# Patient Record
Sex: MALE | Race: BLACK OR AFRICAN AMERICAN | NOT HISPANIC OR LATINO | Employment: FULL TIME | URBAN - METROPOLITAN AREA
[De-identification: names, ages, dates, MRNs, and addresses within clinical notes are randomized per-mention and may not be internally consistent; named-entity substitution may affect disease eponyms.]

---

## 2023-05-30 ENCOUNTER — OFFICE VISIT (OUTPATIENT)
Dept: PEDIATRICS | Facility: CLINIC | Age: 23
End: 2023-05-30
Payer: COMMERCIAL

## 2023-05-30 VITALS — BODY MASS INDEX: 29.34 KG/M2 | TEMPERATURE: 98.1 F | WEIGHT: 160.44 LBS

## 2023-05-30 DIAGNOSIS — L25.9 CONTACT DERMATITIS AND ECZEMA: Primary | ICD-10-CM

## 2023-05-30 DIAGNOSIS — S92.404A CLOSED NONDISPLACED FRACTURE OF PHALANX OF RIGHT GREAT TOE, UNSPECIFIED PHALANX, INITIAL ENCOUNTER: ICD-10-CM

## 2023-05-30 DIAGNOSIS — M79.671 FOOT PAIN, RIGHT: ICD-10-CM

## 2023-05-30 PROCEDURE — 99214 OFFICE O/P EST MOD 30 MIN: CPT | Performed by: PEDIATRICS

## 2023-05-30 PROCEDURE — L4360 PNEUMAT WALKING BOOT PRE CST: HCPCS | Performed by: PEDIATRICS

## 2023-05-30 RX ORDER — TRIAMCINOLONE ACETONIDE 1 MG/G
OINTMENT TOPICAL 2 TIMES DAILY PRN
Qty: 15 G | Refills: 1 | Status: SHIPPED | OUTPATIENT
Start: 2023-05-30

## 2023-05-30 NOTE — PROGRESS NOTES
Willian Glez is a 22 y.o. male who presents for Foot Pain and a rash on his neck    HPI  Willian tripped over the dog a few days ago and has pain in his right great toe.  He has been limping and has not been going to work.  He also has a rash onhis neck that is burning and itchy    For these reasons he has not been attending work and has some paperwork that needs to be signed.  Objective   Temp 36.7 °C (98.1 °F)   Wt 72.8 kg (160 lb 7 oz)   BMI 29.34 kg/m²     Physical Exam  Constitutional:       General: He is not in acute distress.     Appearance: Normal appearance. He is normal weight.   Musculoskeletal:      Right foot: Tenderness (over great toe and distal 1st metatarsal- no visible bruising) present. No swelling or deformity.      Comments: No damage to nail   Skin:     Findings: Rash present. Rash is scaling (thickened red inflalmed dry around neck on areas of jacket collar distribution).         Assessment/Plan   1. Contact dermatitis and eczema  triamcinolone (Kenalog) 0.1 % ointment      2. Foot pain, right  XR foot left 1-2 views    XR foot left 1-2 views    CANCELED: XR foot right 1-2 views      3. Closed nondisplaced fracture of phalanx of right great toe, unspecified phalanx, initial encounter      Placed in walking boot for 3 weeks        Will call with official radiology reading    Supportive care measures and expected course of condition reviewed.  Followup as needed no improvement.

## 2023-05-31 DIAGNOSIS — J45.909 UNSPECIFIED ASTHMA, UNCOMPLICATED (HHS-HCC): ICD-10-CM

## 2023-05-31 RX ORDER — MOMETASONE FUROATE AND FORMOTEROL FUMARATE DIHYDRATE 200; 5 UG/1; UG/1
AEROSOL RESPIRATORY (INHALATION)
Qty: 8.8 G | Refills: 1 | Status: SHIPPED | OUTPATIENT
Start: 2023-05-31 | End: 2024-02-05

## 2023-05-31 NOTE — TELEPHONE ENCOUNTER
Please let Willian know I refilled his asthma medication, and he needs to make a well visit in order to continue prescribing his meds.

## 2023-06-22 PROBLEM — R74.8 ELEVATED LIVER ENZYMES: Status: ACTIVE | Noted: 2023-06-22

## 2023-06-22 PROBLEM — J06.9 URI (UPPER RESPIRATORY INFECTION): Status: ACTIVE | Noted: 2023-06-22

## 2023-06-22 PROBLEM — G47.00 INSOMNIA: Status: ACTIVE | Noted: 2023-06-22

## 2023-06-22 PROBLEM — K80.20 CHOLELITHIASIS: Status: ACTIVE | Noted: 2023-06-22

## 2023-06-22 PROBLEM — L30.9 ECZEMA: Status: ACTIVE | Noted: 2023-06-22

## 2023-06-22 PROBLEM — R11.10 EMESIS: Status: ACTIVE | Noted: 2023-06-22

## 2023-06-22 PROBLEM — J45.909 ASTHMA (HHS-HCC): Status: ACTIVE | Noted: 2023-06-22

## 2023-06-22 PROBLEM — T78.2XXA ANAPHYLAXIS: Status: ACTIVE | Noted: 2023-06-22

## 2023-06-22 PROBLEM — H93.13 TINNITUS OF BOTH EARS: Status: ACTIVE | Noted: 2023-06-22

## 2023-06-22 PROBLEM — R04.0 RECURRENT EPISTAXIS: Status: ACTIVE | Noted: 2023-06-22

## 2023-06-22 PROBLEM — R52 COMPLAINTS OF TOTAL BODY PAIN: Status: ACTIVE | Noted: 2023-06-22

## 2023-06-22 PROBLEM — R19.7 DIARRHEA: Status: ACTIVE | Noted: 2023-06-22

## 2023-06-22 PROBLEM — F54 PSYCHOLOGICAL FACTORS AFFECTING MEDICAL CONDITION: Status: ACTIVE | Noted: 2023-06-22

## 2023-06-22 PROBLEM — R10.11 RIGHT UPPER QUADRANT ABDOMINAL PAIN: Status: ACTIVE | Noted: 2023-06-22

## 2023-06-22 PROBLEM — N50.0 ATROPHIC TESTICLE: Status: ACTIVE | Noted: 2023-06-22

## 2023-06-22 PROBLEM — H10.10 ALLERGIC RHINOCONJUNCTIVITIS: Status: ACTIVE | Noted: 2023-06-22

## 2023-06-22 PROBLEM — G43.909 MIGRAINE HEADACHE: Status: ACTIVE | Noted: 2023-06-22

## 2023-06-22 PROBLEM — Z02.82 ADOPTED: Status: ACTIVE | Noted: 2023-06-22

## 2023-06-22 PROBLEM — R10.9 ABDOMINAL PAIN: Status: ACTIVE | Noted: 2023-06-22

## 2023-06-22 PROBLEM — Z91.011 ALLERGY TO MILK PRODUCTS: Status: ACTIVE | Noted: 2023-06-22

## 2023-06-22 PROBLEM — Z78.9 ADOPTED: Status: ACTIVE | Noted: 2023-06-22

## 2023-06-22 PROBLEM — J30.9 ALLERGIC RHINOCONJUNCTIVITIS: Status: ACTIVE | Noted: 2023-06-22

## 2023-06-22 PROBLEM — K59.00 CONSTIPATION: Status: ACTIVE | Noted: 2023-06-22

## 2023-06-22 PROBLEM — R10.9 STOMACH PAIN: Status: ACTIVE | Noted: 2023-06-22

## 2023-06-22 PROBLEM — L20.9 ATOPIC DERMATITIS: Status: ACTIVE | Noted: 2023-06-22

## 2023-06-22 PROBLEM — R10.811 RIGHT UPPER QUADRANT ABDOMINAL TENDERNESS: Status: ACTIVE | Noted: 2023-06-22

## 2023-06-22 PROBLEM — U07.1 DISEASE DUE TO SEVERE ACUTE RESPIRATORY SYNDROME CORONAVIRUS 2 (SARS-COV-2): Status: ACTIVE | Noted: 2023-06-22

## 2023-06-22 PROBLEM — F43.23 ADJUSTMENT DISORDER WITH MIXED ANXIETY AND DEPRESSED MOOD: Status: ACTIVE | Noted: 2023-06-22

## 2023-06-22 PROBLEM — R51.9 HEADACHE: Status: ACTIVE | Noted: 2023-06-22

## 2023-06-22 PROBLEM — G43.109 MIGRAINE WITH AURA: Status: ACTIVE | Noted: 2023-06-22

## 2024-01-06 ENCOUNTER — APPOINTMENT (OUTPATIENT)
Dept: RADIOLOGY | Facility: HOSPITAL | Age: 24
End: 2024-01-06
Payer: COMMERCIAL

## 2024-01-06 ENCOUNTER — HOSPITAL ENCOUNTER (EMERGENCY)
Facility: HOSPITAL | Age: 24
Discharge: HOME | End: 2024-01-06
Attending: EMERGENCY MEDICINE
Payer: COMMERCIAL

## 2024-01-06 VITALS
RESPIRATION RATE: 18 BRPM | BODY MASS INDEX: 27.31 KG/M2 | HEART RATE: 89 BPM | HEIGHT: 64 IN | SYSTOLIC BLOOD PRESSURE: 121 MMHG | TEMPERATURE: 97.9 F | WEIGHT: 160 LBS | DIASTOLIC BLOOD PRESSURE: 78 MMHG | OXYGEN SATURATION: 99 %

## 2024-01-06 DIAGNOSIS — R10.31 RIGHT LOWER QUADRANT ABDOMINAL PAIN: Primary | ICD-10-CM

## 2024-01-06 DIAGNOSIS — K59.00 CONSTIPATION, UNSPECIFIED CONSTIPATION TYPE: ICD-10-CM

## 2024-01-06 LAB
ALBUMIN SERPL BCP-MCNC: 4.7 G/DL (ref 3.4–5)
ALP SERPL-CCNC: 58 U/L (ref 33–120)
ALT SERPL W P-5'-P-CCNC: 48 U/L (ref 10–52)
ANION GAP SERPL CALC-SCNC: 12 MMOL/L (ref 10–20)
AST SERPL W P-5'-P-CCNC: 26 U/L (ref 9–39)
BASOPHILS # BLD AUTO: 0.03 X10*3/UL (ref 0–0.1)
BASOPHILS NFR BLD AUTO: 0.5 %
BILIRUB SERPL-MCNC: 0.3 MG/DL (ref 0–1.2)
BUN SERPL-MCNC: 18 MG/DL (ref 6–23)
CALCIUM SERPL-MCNC: 10 MG/DL (ref 8.6–10.6)
CHLORIDE SERPL-SCNC: 105 MMOL/L (ref 98–107)
CO2 SERPL-SCNC: 25 MMOL/L (ref 21–32)
CREAT SERPL-MCNC: 0.97 MG/DL (ref 0.5–1.3)
EOSINOPHIL # BLD AUTO: 0.21 X10*3/UL (ref 0–0.7)
EOSINOPHIL NFR BLD AUTO: 3.4 %
ERYTHROCYTE [DISTWIDTH] IN BLOOD BY AUTOMATED COUNT: 13.1 % (ref 11.5–14.5)
FLUAV RNA RESP QL NAA+PROBE: NOT DETECTED
FLUBV RNA RESP QL NAA+PROBE: NOT DETECTED
GFR SERPL CREATININE-BSD FRML MDRD: >90 ML/MIN/1.73M*2
GLUCOSE SERPL-MCNC: 113 MG/DL (ref 74–99)
HCT VFR BLD AUTO: 38.7 % (ref 41–52)
HGB BLD-MCNC: 13 G/DL (ref 13.5–17.5)
HOLD SPECIMEN: NORMAL
IMM GRANULOCYTES # BLD AUTO: 0.01 X10*3/UL (ref 0–0.7)
IMM GRANULOCYTES NFR BLD AUTO: 0.2 % (ref 0–0.9)
LIPASE SERPL-CCNC: 28 U/L (ref 9–82)
LYMPHOCYTES # BLD AUTO: 2.4 X10*3/UL (ref 1.2–4.8)
LYMPHOCYTES NFR BLD AUTO: 39.1 %
MCH RBC QN AUTO: 24.8 PG (ref 26–34)
MCHC RBC AUTO-ENTMCNC: 33.6 G/DL (ref 32–36)
MCV RBC AUTO: 74 FL (ref 80–100)
MONOCYTES # BLD AUTO: 0.55 X10*3/UL (ref 0.1–1)
MONOCYTES NFR BLD AUTO: 9 %
NEUTROPHILS # BLD AUTO: 2.94 X10*3/UL (ref 1.2–7.7)
NEUTROPHILS NFR BLD AUTO: 47.8 %
NRBC BLD-RTO: 0 /100 WBCS (ref 0–0)
PLATELET # BLD AUTO: 289 X10*3/UL (ref 150–450)
POTASSIUM SERPL-SCNC: 4.3 MMOL/L (ref 3.5–5.3)
PROT SERPL-MCNC: 7.2 G/DL (ref 6.4–8.2)
RBC # BLD AUTO: 5.24 X10*6/UL (ref 4.5–5.9)
SARS-COV-2 RNA RESP QL NAA+PROBE: NOT DETECTED
SODIUM SERPL-SCNC: 138 MMOL/L (ref 136–145)
WBC # BLD AUTO: 6.1 X10*3/UL (ref 4.4–11.3)

## 2024-01-06 PROCEDURE — 84075 ASSAY ALKALINE PHOSPHATASE: CPT

## 2024-01-06 PROCEDURE — 96361 HYDRATE IV INFUSION ADD-ON: CPT

## 2024-01-06 PROCEDURE — 96374 THER/PROPH/DIAG INJ IV PUSH: CPT

## 2024-01-06 PROCEDURE — 99285 EMERGENCY DEPT VISIT HI MDM: CPT | Performed by: EMERGENCY MEDICINE

## 2024-01-06 PROCEDURE — 2500000004 HC RX 250 GENERAL PHARMACY W/ HCPCS (ALT 636 FOR OP/ED): Mod: SE

## 2024-01-06 PROCEDURE — 36415 COLL VENOUS BLD VENIPUNCTURE: CPT

## 2024-01-06 PROCEDURE — 74177 CT ABD & PELVIS W/CONTRAST: CPT | Performed by: RADIOLOGY

## 2024-01-06 PROCEDURE — 87636 SARSCOV2 & INF A&B AMP PRB: CPT

## 2024-01-06 PROCEDURE — 2550000001 HC RX 255 CONTRASTS: Mod: SE | Performed by: EMERGENCY MEDICINE

## 2024-01-06 PROCEDURE — 74177 CT ABD & PELVIS W/CONTRAST: CPT

## 2024-01-06 PROCEDURE — 83690 ASSAY OF LIPASE: CPT

## 2024-01-06 PROCEDURE — 85025 COMPLETE CBC W/AUTO DIFF WBC: CPT

## 2024-01-06 PROCEDURE — 99284 EMERGENCY DEPT VISIT MOD MDM: CPT | Performed by: EMERGENCY MEDICINE

## 2024-01-06 RX ORDER — POLYETHYLENE GLYCOL 3350 17 G/17G
17 POWDER, FOR SOLUTION ORAL DAILY
Qty: 51 G | Refills: 0 | Status: SHIPPED | OUTPATIENT
Start: 2024-01-06 | End: 2024-01-09

## 2024-01-06 RX ORDER — ACETAMINOPHEN 325 MG/1
975 TABLET ORAL ONCE
Status: COMPLETED | OUTPATIENT
Start: 2024-01-06 | End: 2024-01-06

## 2024-01-06 RX ORDER — ONDANSETRON HYDROCHLORIDE 2 MG/ML
4 INJECTION, SOLUTION INTRAVENOUS ONCE
Status: COMPLETED | OUTPATIENT
Start: 2024-01-06 | End: 2024-01-06

## 2024-01-06 RX ADMIN — SODIUM CHLORIDE, POTASSIUM CHLORIDE, SODIUM LACTATE AND CALCIUM CHLORIDE 1000 ML: 600; 310; 30; 20 INJECTION, SOLUTION INTRAVENOUS at 01:52

## 2024-01-06 RX ADMIN — ACETAMINOPHEN 975 MG: 325 TABLET ORAL at 01:18

## 2024-01-06 RX ADMIN — IOHEXOL 80 ML: 350 INJECTION, SOLUTION INTRAVENOUS at 03:05

## 2024-01-06 RX ADMIN — ONDANSETRON 4 MG: 2 INJECTION INTRAMUSCULAR; INTRAVENOUS at 01:18

## 2024-01-06 ASSESSMENT — COLUMBIA-SUICIDE SEVERITY RATING SCALE - C-SSRS
6. HAVE YOU EVER DONE ANYTHING, STARTED TO DO ANYTHING, OR PREPARED TO DO ANYTHING TO END YOUR LIFE?: NO
1. IN THE PAST MONTH, HAVE YOU WISHED YOU WERE DEAD OR WISHED YOU COULD GO TO SLEEP AND NOT WAKE UP?: NO
2. HAVE YOU ACTUALLY HAD ANY THOUGHTS OF KILLING YOURSELF?: NO

## 2024-01-06 NOTE — PROGRESS NOTES
Patient signed to me pending CT imaging of the abdomen pelvis with concern for appendicitis.  CT imaging reviewed unremarkable for any acute appendicitis, any acute intra-abdominal pathology.  Patient is noted be constipated.  Patient provided with prescription for MiraLAX, discharge from emergency department stable condition.    Patient seen and discussed with time physician.    Chuck Martin M.D.  PGY-3 EM

## 2024-01-06 NOTE — ED TRIAGE NOTES
Pt to ED c/o right sided bd pain that began earlier today. Pt states he has had continuous nausea with one episode of vomiting  while at work today. Pt also states 3-4 episodes of diarrhea. Pt rates pain 410 currently. Pt has PMH of asthma

## 2024-01-06 NOTE — ED PROVIDER NOTES
CC: Abdominal Pain     HPI:  Patient is a 23-year-old male with a past medical history of asthma who presents to the ED for right lower quadrant abdominal pain.  Patient noted that his pain began approximately today at 10 AM.  He is noted vomiting as well as diarrhea since that time.  He has noted 1 episode of nonbloody emesis and 4 episodes of nonbloody diarrhea.  Patient noted that previously he had viral URI type symptoms of headache, cough, and congestion.  He notes resolution over those symptoms.  Continues to note nausea.  Denied alcohol, tobacco, and substance use.  Denies history of abdominal surgery.  Denied any new medications or new foods.  Patient notes that he works in [employer] and is had multiple sick contacts.  Denied fevers, chills, trauma, falls, chest pain, difficulty breathing, dysuria, testicular pain, and headache.    Limitations to history: None  Independent historian(s): None  Records Reviewed: Recent available ED and inpatient notes reviewed in EMR.    PMHx/PSHx:  Per HPI.   - has a past medical history of Bilateral intraabdominal testes, Cough, unspecified (2016), Lower abdominal pain, unspecified, Personal history of (healed) traumatic fracture (2016), Personal history of other diseases of the digestive system (2014),  , unspecified weeks of gestation, Testicular pain, unspecified (2016), Tinnitus, bilateral (2020), and Unspecified fracture of unspecified metacarpal bone, initial encounter for closed fracture (2016).  - has a past surgical history that includes Other surgical history (2014) and Other surgical history (2014).    Medications:  Reviewed in EMR. See EMR for complete list of medications and doses.    Allergies:  Bee pollen and Peanut    Social History:  - Tobacco:  has no history on file for tobacco use.   - Alcohol:  has no history on file for alcohol use.   - Illicit Drugs:  has no history on file for drug use.      ROS:  Per HPI.       ???????????????????????????????????????????????????????????????  Triage Vitals:  T 36.6 °C (97.9 °F)  HR 89  /78  RR 18  O2        Physical Exam    General: Patient resting comfortably in bed, no acute distress, breathing easily, well appearing, and appropriately conversational without confusion or gross mental status changes.  Head: Normocephalic. Atraumatic.    Neck: FROM. No gross masses.   Eyes: EOMI. No scleral icterus or injection.   ENT: Moist mucous membranes, no apparent trauma or lesions.  CV: Regular rhythm. No murmurs, rubs, gallops appreciated. 2+ radial pulses bilaterally.  Resp: Clear to auscultation bilaterally. No respiratory distress.   GI: Soft, non-distended.  TTP of the right lower quadrant.  Positive McBurney's point.  Negative Rovsing sign.  Positive iliopsoas sign.  No rebound tenderness or guarding. No palpable masses.  : No suprapubic or CVA tenderness.   MSK: Full ROM in bilateral upper and lower extremities. No gross step offs or deformities.  EXT: No peripheral edema, contusions, or wounds.   Skin: Warm and dry, no rashes or lesions.  Neuro:  No focal neurological deficits from stated baseline. Speech fluent.  Psych: Appropriate mood and behavior, converses and responds appropriately.    ???????????????????????????????????????????????????????????????  Labs:   Labs Reviewed   CBC WITH AUTO DIFFERENTIAL   COMPREHENSIVE METABOLIC PANEL   LIPASE   SARS-COV-2 AND INFLUENZA A/B PCR        Imaging:   No orders to display       MDM:  Patient is a 23-year-old male with a past medical history of asthma who presents to the ED for right lower quadrant abdominal pain.  Patient is HDS and vitals WNL.  Physical exam findings significant for right lower quadrant TTP, positive McBurney's point, and positive iliopsoas sign.  Low clinical concern for SBO, mesenteric ischemia, pyelonephritis, cholecystitis, STI, UTI, and aortic pathology.  Patient ordered administered IV  fluids, Tylenol, and Zofran.  CBC did not display leukocytosis and mild anemia of 13.0.  Electrolytes were within normal limits.  LFTs were unremarkable.  Lipase was normal.  Viral testing was negative.  Concern for possibility of viral etiology of the patient's presentation versus appendicitis.  CTAP ordered to evaluate for appendicitis.  Patient signed out to Dr. Martin in stable condition pending CTAP.    ED Course:  ED Course as of 01/07/24 1656   Sat Jan 06, 2024   0450 CT abdomen pelvis w IV contrast [JY]      ED Course User Index  [JY] Armando AUGUSTINE Guillaume, DO         Diagnoses as of 01/07/24 1656   Right lower quadrant abdominal pain   Constipation, unspecified constipation type       Social Determinants Limiting Care:  None identified    Disposition:  Signed out to Dr. Martin in stable condition.    Fernando Quinones MD   Emergency Medicine PGY-2  [facility]    Comment: Please note this report has been produced using speech recognition software and may contain errors related to that system including errors in grammar, punctuation, and spelling as well as words and phrases that may be inappropriate.  If there are any questions or concerns please feel free to contact the dictating provider for clarification.    Procedures ? SmartLinks last updated 1/6/2024 1:15 AM        Fernando Quinones MD  Resident  01/07/24 [id number]

## 2024-02-05 DIAGNOSIS — J45.909 UNSPECIFIED ASTHMA, UNCOMPLICATED (HHS-HCC): ICD-10-CM

## 2024-02-05 RX ORDER — MOMETASONE FUROATE AND FORMOTEROL FUMARATE DIHYDRATE 200; 5 UG/1; UG/1
AEROSOL RESPIRATORY (INHALATION)
Qty: 13 G | Refills: 6 | Status: SHIPPED | OUTPATIENT
Start: 2024-02-05

## 2024-02-16 ENCOUNTER — TELEPHONE (OUTPATIENT)
Dept: PEDIATRICS | Facility: CLINIC | Age: 24
End: 2024-02-16
Payer: COMMERCIAL

## 2024-02-16 NOTE — TELEPHONE ENCOUNTER
Called and left a message.  [facility] so I suggested he go to medicaid.gov and see if he still qualifies or what other programs might be available for him.  Suggested he call me back with questions.

## 2024-02-16 NOTE — TELEPHONE ENCOUNTER
Willian left a . He was taken off of his health insurance. He does not have coverage for his medication. He is wondering if there is something that you can do. Thanks     [phone]

## 2024-08-02 ENCOUNTER — HOSPITAL ENCOUNTER (EMERGENCY)
Facility: HOSPITAL | Age: 24
Discharge: HOME | End: 2024-08-03
Attending: EMERGENCY MEDICINE
Payer: COMMERCIAL

## 2024-08-02 VITALS
SYSTOLIC BLOOD PRESSURE: 127 MMHG | HEART RATE: 77 BPM | HEIGHT: 64 IN | WEIGHT: 150 LBS | BODY MASS INDEX: 25.61 KG/M2 | OXYGEN SATURATION: 97 % | TEMPERATURE: 98.2 F | DIASTOLIC BLOOD PRESSURE: 65 MMHG | RESPIRATION RATE: 18 BRPM

## 2024-08-02 DIAGNOSIS — K29.00 ACUTE GASTRITIS WITHOUT HEMORRHAGE, UNSPECIFIED GASTRITIS TYPE: Primary | ICD-10-CM

## 2024-08-02 LAB
ANION GAP BLDV CALCULATED.4IONS-SCNC: 12 MMOL/L (ref 10–25)
BASE EXCESS BLDV CALC-SCNC: 1.7 MMOL/L (ref -2–3)
BODY TEMPERATURE: 37 DEGREES CELSIUS
CA-I BLDV-SCNC: 1.17 MMOL/L (ref 1.1–1.33)
CHLORIDE BLDV-SCNC: 101 MMOL/L (ref 98–107)
GLUCOSE BLDV-MCNC: 95 MG/DL (ref 74–99)
HCO3 BLDV-SCNC: 26.6 MMOL/L (ref 22–26)
HCT VFR BLD EST: 43 % (ref 41–52)
HGB BLDV-MCNC: 14.4 G/DL (ref 13.5–17.5)
INHALED O2 CONCENTRATION: 21 %
LACTATE BLDV-SCNC: 0.9 MMOL/L (ref 0.4–2)
OXYHGB MFR BLDV: 74.3 % (ref 45–75)
PCO2 BLDV: 42 MM HG (ref 41–51)
PH BLDV: 7.41 PH (ref 7.33–7.43)
PO2 BLDV: 46 MM HG (ref 35–45)
POTASSIUM BLDV-SCNC: 4.2 MMOL/L (ref 3.5–5.3)
SAO2 % BLDV: 75 % (ref 45–75)
SODIUM BLDV-SCNC: 135 MMOL/L (ref 136–145)

## 2024-08-02 PROCEDURE — 84132 ASSAY OF SERUM POTASSIUM: CPT | Mod: 59

## 2024-08-02 PROCEDURE — 84132 ASSAY OF SERUM POTASSIUM: CPT | Performed by: EMERGENCY MEDICINE

## 2024-08-02 PROCEDURE — 84443 ASSAY THYROID STIM HORMONE: CPT | Performed by: EMERGENCY MEDICINE

## 2024-08-02 PROCEDURE — 83735 ASSAY OF MAGNESIUM: CPT

## 2024-08-02 PROCEDURE — 85025 COMPLETE CBC W/AUTO DIFF WBC: CPT

## 2024-08-02 PROCEDURE — 99285 EMERGENCY DEPT VISIT HI MDM: CPT | Performed by: EMERGENCY MEDICINE

## 2024-08-02 PROCEDURE — 99284 EMERGENCY DEPT VISIT MOD MDM: CPT

## 2024-08-02 PROCEDURE — 83690 ASSAY OF LIPASE: CPT

## 2024-08-02 PROCEDURE — 36415 COLL VENOUS BLD VENIPUNCTURE: CPT | Performed by: EMERGENCY MEDICINE

## 2024-08-02 RX ORDER — DICYCLOMINE HYDROCHLORIDE 10 MG/ML
20 INJECTION INTRAMUSCULAR ONCE
Status: DISCONTINUED | OUTPATIENT
Start: 2024-08-02 | End: 2024-08-03

## 2024-08-02 RX ORDER — FAMOTIDINE 10 MG/ML
20 INJECTION INTRAVENOUS ONCE
Status: COMPLETED | OUTPATIENT
Start: 2024-08-02 | End: 2024-08-03

## 2024-08-02 RX ORDER — ONDANSETRON HYDROCHLORIDE 2 MG/ML
4 INJECTION, SOLUTION INTRAVENOUS ONCE
Status: COMPLETED | OUTPATIENT
Start: 2024-08-02 | End: 2024-08-03

## 2024-08-02 ASSESSMENT — COLUMBIA-SUICIDE SEVERITY RATING SCALE - C-SSRS
1. IN THE PAST MONTH, HAVE YOU WISHED YOU WERE DEAD OR WISHED YOU COULD GO TO SLEEP AND NOT WAKE UP?: NO
6. HAVE YOU EVER DONE ANYTHING, STARTED TO DO ANYTHING, OR PREPARED TO DO ANYTHING TO END YOUR LIFE?: NO
2. HAVE YOU ACTUALLY HAD ANY THOUGHTS OF KILLING YOURSELF?: NO

## 2024-08-02 ASSESSMENT — LIFESTYLE VARIABLES
HAVE YOU EVER FELT YOU SHOULD CUT DOWN ON YOUR DRINKING: NO
EVER FELT BAD OR GUILTY ABOUT YOUR DRINKING: NO
TOTAL SCORE: 0
EVER HAD A DRINK FIRST THING IN THE MORNING TO STEADY YOUR NERVES TO GET RID OF A HANGOVER: NO
HAVE PEOPLE ANNOYED YOU BY CRITICIZING YOUR DRINKING: NO

## 2024-08-02 ASSESSMENT — PAIN - FUNCTIONAL ASSESSMENT: PAIN_FUNCTIONAL_ASSESSMENT: 0-10

## 2024-08-02 ASSESSMENT — PAIN SCALES - GENERAL: PAINLEVEL_OUTOF10: 7

## 2024-08-02 ASSESSMENT — PAIN DESCRIPTION - PROGRESSION: CLINICAL_PROGRESSION: NOT CHANGED

## 2024-08-02 ASSESSMENT — PAIN DESCRIPTION - LOCATION: LOCATION: ABDOMEN

## 2024-08-02 NOTE — Clinical Note
Willian Glez was seen and treated in our emergency department on 8/2/2024.  He may return to work on 08/05/2024.       If you have any questions or concerns, please don't hesitate to call.      El Baker, DO

## 2024-08-03 ENCOUNTER — APPOINTMENT (OUTPATIENT)
Dept: RADIOLOGY | Facility: HOSPITAL | Age: 24
End: 2024-08-03
Payer: COMMERCIAL

## 2024-08-03 LAB
ALBUMIN SERPL BCP-MCNC: 4.8 G/DL (ref 3.4–5)
ALP SERPL-CCNC: 59 U/L (ref 33–120)
ALT SERPL W P-5'-P-CCNC: 20 U/L (ref 10–52)
ANION GAP SERPL CALC-SCNC: 15 MMOL/L (ref 10–20)
APPEARANCE UR: ABNORMAL
AST SERPL W P-5'-P-CCNC: 24 U/L (ref 9–39)
BASOPHILS # BLD AUTO: 0.05 X10*3/UL (ref 0–0.1)
BASOPHILS NFR BLD AUTO: 0.5 %
BILIRUB SERPL-MCNC: 0.5 MG/DL (ref 0–1.2)
BILIRUB UR STRIP.AUTO-MCNC: NEGATIVE MG/DL
BUN SERPL-MCNC: 21 MG/DL (ref 6–23)
CALCIUM SERPL-MCNC: 9.7 MG/DL (ref 8.6–10.6)
CHLORIDE SERPL-SCNC: 102 MMOL/L (ref 98–107)
CO2 SERPL-SCNC: 25 MMOL/L (ref 21–32)
COLOR UR: YELLOW
CREAT SERPL-MCNC: 1.26 MG/DL (ref 0.5–1.3)
EGFRCR SERPLBLD CKD-EPI 2021: 83 ML/MIN/1.73M*2
EOSINOPHIL # BLD AUTO: 0.26 X10*3/UL (ref 0–0.7)
EOSINOPHIL NFR BLD AUTO: 2.8 %
ERYTHROCYTE [DISTWIDTH] IN BLOOD BY AUTOMATED COUNT: 13.6 % (ref 11.5–14.5)
GLUCOSE SERPL-MCNC: 90 MG/DL (ref 74–99)
GLUCOSE UR STRIP.AUTO-MCNC: NORMAL MG/DL
HCT VFR BLD AUTO: 41.9 % (ref 41–52)
HGB BLD-MCNC: 14 G/DL (ref 13.5–17.5)
HOLD SPECIMEN: NORMAL
IMM GRANULOCYTES # BLD AUTO: 0.03 X10*3/UL (ref 0–0.7)
IMM GRANULOCYTES NFR BLD AUTO: 0.3 % (ref 0–0.9)
KETONES UR STRIP.AUTO-MCNC: NEGATIVE MG/DL
LEUKOCYTE ESTERASE UR QL STRIP.AUTO: NEGATIVE
LIPASE SERPL-CCNC: 23 U/L (ref 9–82)
LYMPHOCYTES # BLD AUTO: 2.5 X10*3/UL (ref 1.2–4.8)
LYMPHOCYTES NFR BLD AUTO: 26.5 %
MAGNESIUM SERPL-MCNC: 2.31 MG/DL (ref 1.6–2.4)
MCH RBC QN AUTO: 24.8 PG (ref 26–34)
MCHC RBC AUTO-ENTMCNC: 33.4 G/DL (ref 32–36)
MCV RBC AUTO: 74 FL (ref 80–100)
MONOCYTES # BLD AUTO: 0.76 X10*3/UL (ref 0.1–1)
MONOCYTES NFR BLD AUTO: 8 %
MUCOUS THREADS #/AREA URNS AUTO: ABNORMAL /LPF
NEUTROPHILS # BLD AUTO: 5.85 X10*3/UL (ref 1.2–7.7)
NEUTROPHILS NFR BLD AUTO: 61.9 %
NITRITE UR QL STRIP.AUTO: NEGATIVE
NRBC BLD-RTO: 0 /100 WBCS (ref 0–0)
PH UR STRIP.AUTO: 7 [PH]
PLATELET # BLD AUTO: 226 X10*3/UL (ref 150–450)
POTASSIUM SERPL-SCNC: 4.1 MMOL/L (ref 3.5–5.3)
PROT SERPL-MCNC: 7.6 G/DL (ref 6.4–8.2)
PROT UR STRIP.AUTO-MCNC: ABNORMAL MG/DL
RBC # BLD AUTO: 5.65 X10*6/UL (ref 4.5–5.9)
RBC # UR STRIP.AUTO: NEGATIVE /UL
RBC #/AREA URNS AUTO: ABNORMAL /HPF
SODIUM SERPL-SCNC: 138 MMOL/L (ref 136–145)
SP GR UR STRIP.AUTO: 1.03
TSH SERPL-ACNC: 1.06 MIU/L (ref 0.44–3.98)
UROBILINOGEN UR STRIP.AUTO-MCNC: NORMAL MG/DL
WBC # BLD AUTO: 9.5 X10*3/UL (ref 4.4–11.3)
WBC #/AREA URNS AUTO: ABNORMAL /HPF

## 2024-08-03 PROCEDURE — 96361 HYDRATE IV INFUSION ADD-ON: CPT

## 2024-08-03 PROCEDURE — 81001 URINALYSIS AUTO W/SCOPE: CPT

## 2024-08-03 PROCEDURE — 2500000004 HC RX 250 GENERAL PHARMACY W/ HCPCS (ALT 636 FOR OP/ED): Mod: SE | Performed by: EMERGENCY MEDICINE

## 2024-08-03 PROCEDURE — 74177 CT ABD & PELVIS W/CONTRAST: CPT | Performed by: RADIOLOGY

## 2024-08-03 PROCEDURE — 96375 TX/PRO/DX INJ NEW DRUG ADDON: CPT

## 2024-08-03 PROCEDURE — 96372 THER/PROPH/DIAG INJ SC/IM: CPT | Performed by: EMERGENCY MEDICINE

## 2024-08-03 PROCEDURE — 2550000001 HC RX 255 CONTRASTS: Mod: SE | Performed by: EMERGENCY MEDICINE

## 2024-08-03 PROCEDURE — 74177 CT ABD & PELVIS W/CONTRAST: CPT

## 2024-08-03 PROCEDURE — 96374 THER/PROPH/DIAG INJ IV PUSH: CPT | Mod: 59

## 2024-08-03 PROCEDURE — 2500000004 HC RX 250 GENERAL PHARMACY W/ HCPCS (ALT 636 FOR OP/ED): Mod: SE

## 2024-08-03 RX ORDER — DICYCLOMINE HYDROCHLORIDE 20 MG/1
20 TABLET ORAL 2 TIMES DAILY
Qty: 20 TABLET | Refills: 0 | Status: SHIPPED | OUTPATIENT
Start: 2024-08-03 | End: 2024-08-13

## 2024-08-03 RX ORDER — PANTOPRAZOLE SODIUM 20 MG/1
40 TABLET, DELAYED RELEASE ORAL DAILY
Qty: 60 TABLET | Refills: 0 | Status: SHIPPED | OUTPATIENT
Start: 2024-08-03 | End: 2024-09-02

## 2024-08-03 RX ORDER — DICYCLOMINE HYDROCHLORIDE 10 MG/ML
20 INJECTION INTRAMUSCULAR ONCE
Status: COMPLETED | OUTPATIENT
Start: 2024-08-03 | End: 2024-08-03

## 2024-08-03 RX ORDER — ONDANSETRON 4 MG/1
4 TABLET, FILM COATED ORAL EVERY 6 HOURS
Qty: 12 TABLET | Refills: 0 | Status: SHIPPED | OUTPATIENT
Start: 2024-08-03 | End: 2024-08-06

## 2024-08-03 RX ADMIN — FAMOTIDINE 20 MG: 10 INJECTION INTRAVENOUS at 00:00

## 2024-08-03 RX ADMIN — DICYCLOMINE HYDROCHLORIDE 20 MG: 10 INJECTION, SOLUTION INTRAMUSCULAR at 00:18

## 2024-08-03 RX ADMIN — ONDANSETRON 4 MG: 2 INJECTION INTRAMUSCULAR; INTRAVENOUS at 00:00

## 2024-08-03 RX ADMIN — SODIUM CHLORIDE, POTASSIUM CHLORIDE, SODIUM LACTATE AND CALCIUM CHLORIDE 1000 ML: 600; 310; 30; 20 INJECTION, SOLUTION INTRAVENOUS at 00:00

## 2024-08-03 RX ADMIN — IOHEXOL 80 ML: 350 INJECTION, SOLUTION INTRAVENOUS at 00:54

## 2024-08-03 NOTE — ED PROVIDER NOTES
Emergency Department Provider Note        History of Present Illness     History provided by: Patient  Limitations to History: None  External Records Reviewed with Brief Summary: Outpatient progress note from ED visits which showed similar presentation in January and  of asthma and Outpatient Labs from 24 which showed normal labs    HPI:  Willian Glez is a 23 y.o. male PMH of asthma coming for 3-day duration of generalized malaise, abdominal pain, nausea.  Abdominal pain is mostly epigastric.  He reports 1 episode of nonbloody diarrhea.  Denies any headache, dizziness, vision changes, neck pain, neck stiffness, chest pain, shortness of breath, vomiting, fevers, chills, dysuria, rash, numbness, tingling, or weakness.  Denies binge alcohol.  Denies flulike symptoms.  No sick contacts. No trauma, falls, or injuries. No passing out.     Physical Exam   Triage vitals:  T 36.8 °C (98.2 °F)  HR 77  /65  RR 18  O2 97 %      General: Awake, alert, oriented, in no acute distress  Eyes: Gaze conjugate. EOMI. No scleral icterus or injection. PERRL.   HENT: Normo-cephalic, atraumatic. Mucous membranes moist. No stridor  Neck: supple, full ROM intact, no meningeal signs.   CV: Regular rate, regular rhythm. Radial pulses 2+ bilaterally  Resp: Breathing non-labored, speaking in full sentences.  Clear to auscultation bilaterally  GI: Soft, non-distended, tender to palpation of epigastric and left upper quadrant. No rebound or guarding. Normal bowel sounds. No peritoneal signs.  MSK/Extremities: No gross bony deformities. Moving all extremities with good tone and ROM intact  Skin: Warm. Appropriate color  Neuro: Alert. Oriented. Face symmetric. Speech is fluent. Cranial nerves 2-12 intact. Gross 5/5 strength and sensation intact in b/l UE and Les. Ambulates with normal gait.   Psych: Appropriate mood and affect    Medical Decision Making & ED Course   Medical Decision Makin y.o. male PMH of asthma coming for  3 days of abdominal pain, malaise and nausea.  Patient hemodynamically stable and well-appearing on exam.  He is afebrile. The patient is in no respiratory distress, satting well on room air. Patient is neurologically intact. In the ED the patient was treated with IV fluids, IV zofran, IV pepcid, and IM bentyl. Differential diagnosis is broad and includes but not limited to pancreatitis, gastritis, gastroenteritis, colitis, PUD, infection, appendicitis, electrolyte abnormality, gastroparesis. CBC showed no leukocytosis. Hemoglobin is normal. CMP, lipase, magnesium, and TSH are unremarkable. Urinalysis showed on evidence of any UTI. Venous full panel showed normal lactate. Patient was offered but declined COVID swab. CT abdomen pelvis ordered ordered showing nonspecific thickening of the gastric wall to the level of the proximal duodenum suggestive of gastritis/peptic ulcer disease. No other acute processes. Patient was informed of the results. Upon repeat evaluation, he reports improvement of his symptoms. He tolerated po challenge and continued to have soft, non-peritonitic abdominal exam. The patient was informed of the results. The patient felt comfortable being discharged home. The patient was instructed of supportive measures and to follow-up with a primary care physician. He was written scripts for bentyl, zofran, and PPI for homegoing. Return precautions were provided, for which the patient expressed understanding. The patient was discharged home in stable condition. They should feel free to return to the Emergency Department at any time should their condition worsen or should they have any questions or concerns.   ----       Social Determinants of Health which Significantly Impact Care: None identified       Independent Result Review and Interpretation: Relevant laboratory and radiographic results were reviewed and independently interpreted by myself.  As necessary, they are commented on in the ED  Course.    Chronic conditions affecting the patient's care: As documented above in MDM    The patient was discussed with the following consultants/services: None    Care Considerations: As documented above in MDM    Disposition   As a result of the work-up, the patient was discharged home.  he was informed of his diagnosis and instructed to come back with any concerns or worsening of condition.  he and was agreeable to the plan as discussed above.  he was given the opportunity to ask questions.  All of the patient's questions were answered.      Patient seen and discussed with ED attending physician.    Patito Sanchez MD  Emergency Medicine       Theresa Tang MD  Resident  08/05/24 1916       Patito Sanchez MD  08/05/24 5510

## 2024-08-03 NOTE — DISCHARGE INSTRUCTIONS
Thank you for allowing us to participate in your healthcare.  You were seen in the ED for abdominal pain and nausea.  We obtained some blood work and imaging of your abdomen that showed evidence of inflammation that indicates gastritis or a stomach ulcer.  We treated you with IV fluids, pain medicine, and medicine to decrease stomach acid production called proton pump inhibitors.  We sent a prescription of pantoprazole 40 mg daily, which she should take for at least 4 weeks and follow-up with your primary care doctor prior to discontinuing.  We also sent some nausea and pain medicine to your pharmacy.  Your labs also showed some blood in your urine, which we also recommend your primary care follows up on.

## 2024-08-23 ENCOUNTER — CLINICAL SUPPORT (OUTPATIENT)
Dept: EMERGENCY MEDICINE | Facility: HOSPITAL | Age: 24
End: 2024-08-23
Payer: COMMERCIAL

## 2024-08-23 ENCOUNTER — HOSPITAL ENCOUNTER (EMERGENCY)
Facility: HOSPITAL | Age: 24
Discharge: HOME | End: 2024-08-24
Attending: STUDENT IN AN ORGANIZED HEALTH CARE EDUCATION/TRAINING PROGRAM
Payer: COMMERCIAL

## 2024-08-23 DIAGNOSIS — R55 VASOVAGAL SYNCOPE: Primary | ICD-10-CM

## 2024-08-23 LAB
ATRIAL RATE: 74 BPM
P AXIS: 46 DEGREES
P OFFSET: 201 MS
P ONSET: 155 MS
PR INTERVAL: 128 MS
Q ONSET: 219 MS
QRS COUNT: 12 BEATS
QRS DURATION: 90 MS
QT INTERVAL: 332 MS
QTC CALCULATION(BAZETT): 368 MS
QTC FREDERICIA: 356 MS
R AXIS: 49 DEGREES
T AXIS: 8 DEGREES
T OFFSET: 385 MS
VENTRICULAR RATE: 74 BPM

## 2024-08-23 PROCEDURE — 99283 EMERGENCY DEPT VISIT LOW MDM: CPT

## 2024-08-23 PROCEDURE — 93005 ELECTROCARDIOGRAM TRACING: CPT | Mod: 59

## 2024-08-23 PROCEDURE — 36415 COLL VENOUS BLD VENIPUNCTURE: CPT

## 2024-08-23 PROCEDURE — 2500000004 HC RX 250 GENERAL PHARMACY W/ HCPCS (ALT 636 FOR OP/ED): Mod: SE

## 2024-08-23 PROCEDURE — 80048 BASIC METABOLIC PNL TOTAL CA: CPT

## 2024-08-23 PROCEDURE — 83735 ASSAY OF MAGNESIUM: CPT

## 2024-08-23 PROCEDURE — 85025 COMPLETE CBC W/AUTO DIFF WBC: CPT

## 2024-08-23 PROCEDURE — 99284 EMERGENCY DEPT VISIT MOD MDM: CPT | Performed by: STUDENT IN AN ORGANIZED HEALTH CARE EDUCATION/TRAINING PROGRAM

## 2024-08-23 PROCEDURE — 93010 ELECTROCARDIOGRAM REPORT: CPT | Performed by: STUDENT IN AN ORGANIZED HEALTH CARE EDUCATION/TRAINING PROGRAM

## 2024-08-23 RX ADMIN — SODIUM CHLORIDE, POTASSIUM CHLORIDE, SODIUM LACTATE AND CALCIUM CHLORIDE 1000 ML: 600; 310; 30; 20 INJECTION, SOLUTION INTRAVENOUS at 23:49

## 2024-08-23 ASSESSMENT — LIFESTYLE VARIABLES
TOTAL SCORE: 0
EVER HAD A DRINK FIRST THING IN THE MORNING TO STEADY YOUR NERVES TO GET RID OF A HANGOVER: NO
HAVE YOU EVER FELT YOU SHOULD CUT DOWN ON YOUR DRINKING: NO
EVER FELT BAD OR GUILTY ABOUT YOUR DRINKING: NO
HAVE PEOPLE ANNOYED YOU BY CRITICIZING YOUR DRINKING: NO

## 2024-08-23 ASSESSMENT — PAIN - FUNCTIONAL ASSESSMENT: PAIN_FUNCTIONAL_ASSESSMENT: 0-10

## 2024-08-23 ASSESSMENT — PAIN DESCRIPTION - PROGRESSION: CLINICAL_PROGRESSION: NOT CHANGED

## 2024-08-23 ASSESSMENT — PAIN SCALES - GENERAL: PAINLEVEL_OUTOF10: 0 - NO PAIN

## 2024-08-23 NOTE — Clinical Note
Willian Glez was seen and treated in our emergency department on 8/23/2024.  He may return to work on 08/24/2024.  Mr. Glez was seen and evaluated at [facility]. Please excuse him from work on this day as it was necessary for him to seek medical attention. Mr. Glez is safe to return to work starting on 8/24/2024, if symptoms do not recur.      If you have any questions or concerns, please don't hesitate to call.      Chepe Murray MD

## 2024-08-24 ENCOUNTER — CLINICAL SUPPORT (OUTPATIENT)
Dept: EMERGENCY MEDICINE | Facility: HOSPITAL | Age: 24
End: 2024-08-24
Payer: COMMERCIAL

## 2024-08-24 VITALS
WEIGHT: 150 LBS | HEIGHT: 64 IN | BODY MASS INDEX: 25.61 KG/M2 | HEART RATE: 72 BPM | TEMPERATURE: 98.3 F | DIASTOLIC BLOOD PRESSURE: 76 MMHG | SYSTOLIC BLOOD PRESSURE: 146 MMHG | RESPIRATION RATE: 15 BRPM | OXYGEN SATURATION: 100 %

## 2024-08-24 LAB
ANION GAP SERPL CALC-SCNC: 14 MMOL/L (ref 10–20)
ATRIAL RATE: 60 BPM
BASOPHILS # BLD AUTO: 0.04 X10*3/UL (ref 0–0.1)
BASOPHILS NFR BLD AUTO: 0.7 %
BUN SERPL-MCNC: 14 MG/DL (ref 6–23)
CALCIUM SERPL-MCNC: 9.3 MG/DL (ref 8.6–10.6)
CHLORIDE SERPL-SCNC: 102 MMOL/L (ref 98–107)
CO2 SERPL-SCNC: 23 MMOL/L (ref 21–32)
CREAT SERPL-MCNC: 0.9 MG/DL (ref 0.5–1.3)
EGFRCR SERPLBLD CKD-EPI 2021: >90 ML/MIN/1.73M*2
EOSINOPHIL # BLD AUTO: 0.36 X10*3/UL (ref 0–0.7)
EOSINOPHIL NFR BLD AUTO: 6.1 %
ERYTHROCYTE [DISTWIDTH] IN BLOOD BY AUTOMATED COUNT: 13.6 % (ref 11.5–14.5)
GLUCOSE SERPL-MCNC: 105 MG/DL (ref 74–99)
HCT VFR BLD AUTO: 38.7 % (ref 41–52)
HGB BLD-MCNC: 13.3 G/DL (ref 13.5–17.5)
HOLD SPECIMEN: NORMAL
HOLD SPECIMEN: NORMAL
IMM GRANULOCYTES # BLD AUTO: 0.01 X10*3/UL (ref 0–0.7)
IMM GRANULOCYTES NFR BLD AUTO: 0.2 % (ref 0–0.9)
LYMPHOCYTES # BLD AUTO: 2.4 X10*3/UL (ref 1.2–4.8)
LYMPHOCYTES NFR BLD AUTO: 40.6 %
MAGNESIUM SERPL-MCNC: 1.98 MG/DL (ref 1.6–2.4)
MCH RBC QN AUTO: 25.4 PG (ref 26–34)
MCHC RBC AUTO-ENTMCNC: 34.4 G/DL (ref 32–36)
MCV RBC AUTO: 74 FL (ref 80–100)
MONOCYTES # BLD AUTO: 0.59 X10*3/UL (ref 0.1–1)
MONOCYTES NFR BLD AUTO: 10 %
NEUTROPHILS # BLD AUTO: 2.51 X10*3/UL (ref 1.2–7.7)
NEUTROPHILS NFR BLD AUTO: 42.4 %
NRBC BLD-RTO: 0 /100 WBCS (ref 0–0)
P AXIS: 58 DEGREES
P OFFSET: 199 MS
P ONSET: 155 MS
PLATELET # BLD AUTO: 208 X10*3/UL (ref 150–450)
POTASSIUM SERPL-SCNC: 4 MMOL/L (ref 3.5–5.3)
PR INTERVAL: 124 MS
Q ONSET: 217 MS
QRS COUNT: 9 BEATS
QRS DURATION: 98 MS
QT INTERVAL: 370 MS
QTC CALCULATION(BAZETT): 370 MS
QTC FREDERICIA: 370 MS
R AXIS: 79 DEGREES
RBC # BLD AUTO: 5.24 X10*6/UL (ref 4.5–5.9)
SODIUM SERPL-SCNC: 135 MMOL/L (ref 136–145)
T AXIS: 33 DEGREES
T OFFSET: 402 MS
VENTRICULAR RATE: 60 BPM
WBC # BLD AUTO: 5.9 X10*3/UL (ref 4.4–11.3)

## 2024-08-24 PROCEDURE — 2500000004 HC RX 250 GENERAL PHARMACY W/ HCPCS (ALT 636 FOR OP/ED): Mod: SE

## 2024-08-24 PROCEDURE — 93010 ELECTROCARDIOGRAM REPORT: CPT

## 2024-08-24 PROCEDURE — 96360 HYDRATION IV INFUSION INIT: CPT

## 2024-08-24 PROCEDURE — 93005 ELECTROCARDIOGRAM TRACING: CPT | Mod: 59

## 2024-08-24 PROCEDURE — 93005 ELECTROCARDIOGRAM TRACING: CPT

## 2024-08-24 RX ADMIN — SODIUM CHLORIDE, POTASSIUM CHLORIDE, SODIUM LACTATE AND CALCIUM CHLORIDE 500 ML: 600; 310; 30; 20 INJECTION, SOLUTION INTRAVENOUS at 01:30

## 2024-08-24 ASSESSMENT — PAIN SCALES - GENERAL
PAINLEVEL_OUTOF10: 0 - NO PAIN

## 2024-08-24 ASSESSMENT — PAIN - FUNCTIONAL ASSESSMENT: PAIN_FUNCTIONAL_ASSESSMENT: 0-10

## 2024-08-24 NOTE — ED PROVIDER NOTES
HPI   Chief Complaint   Patient presents with    Syncope    Dizziness       Willian Glez is a 22 yo. Male with PMHx of Asthma and Gastritis, presenting to the ED for dizziness and syncope while at work. Patient states while he was 4-5 hours into his shift at [employer] he felt a bit dizzy, saw white, and then lost consciousness. He denies any head trauma and states the episode lasted a few seconds before he regained consciousness. He states there was no witnessed shaking, tongue biting, or uncontrolled urination, however he does state there is a family history of seizures on his mothers side. Patient has felt fatigued for a few days and states he hasn't had his normal appetite. In addition he states he has significant life stressors, pertaining to income and possible housing instability, and that he may need to find another job. He denies symptom onset after being seated for a prolonged period of time. 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 Pt denies fever, chest pain, sob, palpations, nausea, vomiting, changes in urinary or bowel function. Patient does endorse fatigue that has been on going for multiple months.               Patient History   Past Medical History:   Diagnosis Date    Bilateral intraabdominal testes     Bilateral cryptorchidism    Cough, unspecified 2016    Cough    Lower abdominal pain, unspecified     Groin pain    Personal history of (healed) traumatic fracture 2016    History of fracture of clavicle    Personal history of other diseases of the digestive system 2014    History of gastroesophageal reflux (GERD)     , unspecified weeks of gestation ([facility])     Prematurity    Testicular pain, unspecified 2016    Testicular pain    Tinnitus, bilateral 2020    Tinnitus of both ears    Unspecified fracture of unspecified metacarpal bone, initial encounter for closed fracture 2016    Fracture,  metacarpal     Past Surgical History:   Procedure Laterality Date    OTHER SURGICAL HISTORY  04/01/2014    Surgery Testis    OTHER SURGICAL HISTORY  04/01/2014    Surgery Testis Orchiopexy, Inguinal Approach     No family history on file.  Social History     Tobacco Use    Smoking status: Unknown    Smokeless tobacco: Not on file   Substance Use Topics    Alcohol use: Not on file    Drug use: Not on file       Physical Exam   ED Triage Vitals [08/23/24 2223]   Temperature Heart Rate Respirations BP   36.8 °C (98.3 °F) 78 16 136/74      Pulse Ox Temp Source Heart Rate Source Patient Position   97 % Oral Monitor Sitting      BP Location FiO2 (%)     -- --       Physical Exam  Constitutional:       General: He is not in acute distress.     Appearance: Normal appearance. He is not ill-appearing.   Cardiovascular:      Rate and Rhythm: Normal rate and regular rhythm.      Pulses: Normal pulses.      Heart sounds: Normal heart sounds. No murmur heard.     No gallop.   Pulmonary:      Effort: Pulmonary effort is normal.      Breath sounds: Normal breath sounds. No stridor. No wheezing.   Abdominal:      General: Abdomen is flat. Bowel sounds are normal. There is no distension.      Palpations: Abdomen is soft. There is no mass.      Tenderness: There is no abdominal tenderness. There is no guarding.   Musculoskeletal:         General: No tenderness or signs of injury. Normal range of motion.   Skin:     General: Skin is warm and dry.      Coloration: Skin is not jaundiced or pale.      Findings: No erythema or rash.   Neurological:      General: No focal deficit present.      Mental Status: He is alert and oriented to person, place, and time.      Cranial Nerves: No cranial nerve deficit.      Sensory: No sensory deficit.      Motor: No weakness.      Coordination: Coordination normal.   Psychiatric:         Mood and Affect: Mood normal.         Behavior: Behavior normal.         ED Course & MDM   Diagnoses as of 08/24/24  0117   Vasovagal syncope     CBC  BMP  Mag  1 L LR bolus  Repeat ECG - NSR  Orthostatic Vitals - 151/87 laying, 124/87 sitting, 139/85 standing  500 ml LR Bolus + Oral rehydration            No data recorded     Leatha Coma Scale Score: 15 (08/23/24 2224 : Justyn Colon, CARMELITA)                           Medical Decision Making  Willian Glez is a 24 yo. Male with PMHx of Asthma and Gastritis, presenting to the ED for dizziness and syncope while at work. Patient states while he was 4-5 hours into his shift at [employer] he felt a bit dizzy, saw white, and then lost consciousness. He denies any head trauma and states the episode lasted a few seconds before he regained consciousness. He states there was no witnessed shaking, tongue biting, or uncontrolled urination, however he does state there is a family history of seizures on his mothers side. Patient has felt fatigued for a few days and states he hasn't had his normal appetite. In addition he states he has significant life stressors, pertaining to income and possible housing instability, and that he may need to find another job. He denies symptom onset after being seated for a prolonged period of time. 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 Pt denies fever, chest pain, sob, palpations, nausea, vomiting, changes in urinary or bowel function.     Pt given 1L LR bolus. CBC c/f iron deficiency anemia (MCV 74 and Hgb 13.3, stable findings), BMP and Mag were unremarkable. ECG reviewed NSR with intermittent peaked T waves, repeat ECG was NSR. Orthostatic vitals notable for /87 laying, 124/87 sitting, 139/85 standing. Pt given additional 500 ml LR bolus and oral rehydration and remained asymptomatic. No concerning features for seizure at this time. Low suspicion for cardiac etiology of syncope. Syncope likely vasovagal in nature given work climate, work period w/o break, significant life stressors, and quick  resolution of symptoms with no further episodes with unremarkable physical exam and workup. At this time patient remains afebrile, hemodynamically stable and appropriate for discharge home. Patient encouraged to schedule short interval follow-up with PCP and will discuss iron supplementation with PCP. As a result of the work-up, the patient was discharged home. He was informed of his diagnosis and instructed to come back with any concerns or worsening of condition. He and was agreeable to the plan as discussed above. He was given the opportunity to ask questions. All of the patient's questions were answered.      Patient seen and discussed with attending physician Dr. Jorge Murray MD  Family Medicine  PGY-2      Amount and/or Complexity of Data Reviewed  External Data Reviewed: labs, ECG and notes.  Labs: ordered. Decision-making details documented in ED Course.  ECG/medicine tests: ordered.        Procedure  Procedures     Chepe Murray MD  Resident  08/24/24 0129

## 2024-08-24 NOTE — ED TRIAGE NOTES
"PT presents to ED via triage for chief complaint of syncope and dizziness. PT states he was at work and felt dizzy and his vision changed where he saw \"white\". PT states he had  syncopal episode from standing. PT denies hitting his head. PT has a history of asthma. PT is Aox4 and ambulates on his own.  "

## 2024-08-25 LAB
ATRIAL RATE: 66 BPM
P AXIS: 104 DEGREES
P OFFSET: 195 MS
P ONSET: 157 MS
PR INTERVAL: 118 MS
Q ONSET: 216 MS
QRS COUNT: 11 BEATS
QRS DURATION: 96 MS
QT INTERVAL: 362 MS
QTC CALCULATION(BAZETT): 379 MS
QTC FREDERICIA: 374 MS
R AXIS: 116 DEGREES
T AXIS: 174 DEGREES
T OFFSET: 397 MS
VENTRICULAR RATE: 66 BPM

## 2024-08-25 PROCEDURE — 93010 ELECTROCARDIOGRAM REPORT: CPT

## 2024-09-06 ENCOUNTER — HOSPITAL ENCOUNTER (EMERGENCY)
Facility: HOSPITAL | Age: 24
Discharge: HOME | End: 2024-09-06

## 2024-09-06 VITALS
WEIGHT: 150 LBS | BODY MASS INDEX: 25.61 KG/M2 | RESPIRATION RATE: 18 BRPM | TEMPERATURE: 97.9 F | DIASTOLIC BLOOD PRESSURE: 72 MMHG | HEART RATE: 97 BPM | OXYGEN SATURATION: 96 % | HEIGHT: 64 IN | SYSTOLIC BLOOD PRESSURE: 130 MMHG

## 2024-09-06 DIAGNOSIS — Z71.1 CONCERN ABOUT STD IN MALE WITHOUT DIAGNOSIS: Primary | ICD-10-CM

## 2024-09-06 LAB
C TRACH RRNA SPEC QL NAA+PROBE: NEGATIVE
HCV AB SER QL: NONREACTIVE
HIV 1+2 AB+HIV1 P24 AG SERPL QL IA: NONREACTIVE
N GONORRHOEA DNA SPEC QL PROBE+SIG AMP: NEGATIVE
T VAGINALIS RRNA SPEC QL NAA+PROBE: NEGATIVE
TREPONEMA PALLIDUM IGG+IGM AB [PRESENCE] IN SERUM OR PLASMA BY IMMUNOASSAY: NONREACTIVE

## 2024-09-06 PROCEDURE — 99284 EMERGENCY DEPT VISIT MOD MDM: CPT | Performed by: PHYSICIAN ASSISTANT

## 2024-09-06 PROCEDURE — 99283 EMERGENCY DEPT VISIT LOW MDM: CPT

## 2024-09-06 PROCEDURE — 87491 CHLMYD TRACH DNA AMP PROBE: CPT | Performed by: PHYSICIAN ASSISTANT

## 2024-09-06 PROCEDURE — 87389 HIV-1 AG W/HIV-1&-2 AB AG IA: CPT | Performed by: PHYSICIAN ASSISTANT

## 2024-09-06 PROCEDURE — 87661 TRICHOMONAS VAGINALIS AMPLIF: CPT | Performed by: PHYSICIAN ASSISTANT

## 2024-09-06 PROCEDURE — 36415 COLL VENOUS BLD VENIPUNCTURE: CPT | Performed by: PHYSICIAN ASSISTANT

## 2024-09-06 PROCEDURE — 86803 HEPATITIS C AB TEST: CPT | Performed by: PHYSICIAN ASSISTANT

## 2024-09-06 PROCEDURE — 86780 TREPONEMA PALLIDUM: CPT | Performed by: PHYSICIAN ASSISTANT

## 2024-09-06 ASSESSMENT — LIFESTYLE VARIABLES
EVER FELT BAD OR GUILTY ABOUT YOUR DRINKING: NO
EVER HAD A DRINK FIRST THING IN THE MORNING TO STEADY YOUR NERVES TO GET RID OF A HANGOVER: NO
TOTAL SCORE: 0
HAVE YOU EVER FELT YOU SHOULD CUT DOWN ON YOUR DRINKING: NO
HAVE PEOPLE ANNOYED YOU BY CRITICIZING YOUR DRINKING: NO

## 2024-09-06 ASSESSMENT — PAIN - FUNCTIONAL ASSESSMENT: PAIN_FUNCTIONAL_ASSESSMENT: 0-10

## 2024-09-06 ASSESSMENT — PAIN SCALES - GENERAL: PAINLEVEL_OUTOF10: 6

## 2024-09-06 NOTE — ED PROVIDER NOTES
"Emergency Department Provider Note        History of Present Illness     23-year-old male otherwise healthy presents with STD concerns.  States he is sexually active without protection.  No known exposure to STDs.  States he has \"weird feeling\" in his genitalia.  Denies any rash or lesions.  Denies any scrotal pain.  Denies any discharge.  States he feels \"funny\" when he urinates however denies any burning sensation.  Denies any frequency or urgency.  States he would like to be tested broadly for STDs.    External Records Reviewed including ED notes, H&P, Discharge Summary, outpatient PCP/specialist notes.  Physical Exam     Triage Vitals: T 36.6 °C (97.9 °F)  HR 97  /72  RR 18  O2 96 %    GEN: NAD  EYES:  EOMs grossly intact, anicteric sclera  ANASTASIA: Mucosa moist.  NECK: Supple.  CARD: RRR  PULMONARY: Moving air well. Clear all lung fields.  ABDOMEN: Soft, no guarding, no rigidity. Nontender. NABS  EXTREMITIES: Full ROM, no pitting edema,   SKIN: Intact, warm and dry  NEURO: Alert and oriented x 3, speech is clear, no obvious deficits noted.   : Declining exam      Medical Decision Making & ED Course     23-year-old male presenting for STD concerns.  On exam he is well-appearing ambulating comfortably.  Vital signs stable.  ABD soft NTTP with NABS.  He declines  exam which I feel is reasonable as he denies any lesions or scrotal pain.  I offered empiric treatment for STDs broadly however he is declining stating he wants to know about any positive results prior to treatment which I feel is reasonable.  He is tested for GC/chlamydia, trichomonas, HIV, syphilis, HCV and should receive a notification for any positive results as well as he will follow-up on the [facility] rut.  He was told to have his partner tested and treated as well for any positive results.  He is educated to practice safe sex.  To follow-up with PCP for persistent symptoms.  Return precautions reviewed.  He verbalized understanding " discharge plan he agreed with plan all questions were answered.    Diagnoses as of 09/06/24 0621   Concern about STD in male without diagnosis     No orders to display     Labs Reviewed   SYPHILIS SCREENING WITH REFLEX   HIV 1/2 ANTIGEN/ANTIBODY SCREEN [facility] REFLEX TO CONFIRMATION   C. TRACHOMATIS + N. GONORRHOEAE, AMPLIFIED   HEPATITIS C ANTIBODY   TRICH VAGINALIS, AMPLIFIED       ----------------------------------------------------------------------------------------------------------------------------    This note was dictated using a speech recognition program.  While an attempt was made at proof reading to minimize errors, minor errors in transcription may be present call for questions.     Harvey Lazar PA-C  09/06/24 0619

## 2024-09-06 NOTE — DISCHARGE INSTRUCTIONS
Your test results will be on the [facility] rut.  You may receive a phone call for any positive results.  You will be instructed on how to proceed with treatment  For any positive results you need to tell your partner to be tested and treated as well.  It is always best to practice safe sex

## 2024-09-06 NOTE — ED TRIAGE NOTES
"Patient states he is having \"symptoms\" (lower abdominal pain, feeling like getting hot, itching to groin) and wants to be tested  " Group Topic:  Process Group     Date: 4/7/2024  Start Time: 1100  End Time: 1200  Facilitators: Netta Borrego    Focus:  Process Group   Number in attendance: 11    Method: Group  Attendance: Present  Participation: Active  Patient Response: Appropriate feedback  Mood: Normal  Affect: Type: Euthymic (normal mood)   Range:  Normal   Congruency: Congruent   Stability: Stable  Behavior/Socialization: Appropriate to group  Thought Process: Demonstrated insight  Task Performance: Follows directions  Patient Evaluation: Independent - full participation

## 2024-09-16 ENCOUNTER — HOSPITAL ENCOUNTER (EMERGENCY)
Facility: HOSPITAL | Age: 24
Discharge: HOME | End: 2024-09-16

## 2024-09-16 ENCOUNTER — CLINICAL SUPPORT (OUTPATIENT)
Dept: EMERGENCY MEDICINE | Facility: HOSPITAL | Age: 24
End: 2024-09-16

## 2024-09-16 VITALS
TEMPERATURE: 97.9 F | RESPIRATION RATE: 18 BRPM | SYSTOLIC BLOOD PRESSURE: 131 MMHG | WEIGHT: 150 LBS | OXYGEN SATURATION: 95 % | BODY MASS INDEX: 23.54 KG/M2 | HEART RATE: 68 BPM | HEIGHT: 67 IN | DIASTOLIC BLOOD PRESSURE: 82 MMHG

## 2024-09-16 DIAGNOSIS — M79.672 LEFT FOOT PAIN: Primary | ICD-10-CM

## 2024-09-16 DIAGNOSIS — Z87.09 HISTORY OF ASTHMA: ICD-10-CM

## 2024-09-16 DIAGNOSIS — F41.9 ANXIETY: ICD-10-CM

## 2024-09-16 LAB
ATRIAL RATE: 79 BPM
GLUCOSE BLD MANUAL STRIP-MCNC: 97 MG/DL (ref 74–99)
P AXIS: 49 DEGREES
P OFFSET: 203 MS
P ONSET: 156 MS
PR INTERVAL: 126 MS
Q ONSET: 219 MS
QRS COUNT: 13 BEATS
QRS DURATION: 88 MS
QT INTERVAL: 342 MS
QTC CALCULATION(BAZETT): 392 MS
QTC FREDERICIA: 375 MS
R AXIS: 52 DEGREES
T AXIS: 10 DEGREES
T OFFSET: 390 MS
VENTRICULAR RATE: 79 BPM

## 2024-09-16 PROCEDURE — 82947 ASSAY GLUCOSE BLOOD QUANT: CPT

## 2024-09-16 PROCEDURE — 93005 ELECTROCARDIOGRAM TRACING: CPT

## 2024-09-16 PROCEDURE — 99284 EMERGENCY DEPT VISIT MOD MDM: CPT

## 2024-09-16 PROCEDURE — 93010 ELECTROCARDIOGRAM REPORT: CPT

## 2024-09-16 PROCEDURE — 2500000001 HC RX 250 WO HCPCS SELF ADMINISTERED DRUGS (ALT 637 FOR MEDICARE OP): Mod: SE

## 2024-09-16 PROCEDURE — 99283 EMERGENCY DEPT VISIT LOW MDM: CPT

## 2024-09-16 RX ORDER — MUPIROCIN 20 MG/G
OINTMENT TOPICAL 2 TIMES DAILY
Qty: 15 G | Refills: 0 | Status: SHIPPED | OUTPATIENT
Start: 2024-09-16 | End: 2024-09-26

## 2024-09-16 RX ORDER — ACETAMINOPHEN 325 MG/1
975 TABLET ORAL ONCE
Status: COMPLETED | OUTPATIENT
Start: 2024-09-16 | End: 2024-09-16

## 2024-09-16 RX ORDER — HYDROXYZINE HYDROCHLORIDE 25 MG/1
50 TABLET, FILM COATED ORAL ONCE
Status: COMPLETED | OUTPATIENT
Start: 2024-09-16 | End: 2024-09-16

## 2024-09-16 RX ORDER — ALBUTEROL SULFATE 90 UG/1
2 INHALANT RESPIRATORY (INHALATION) EVERY 6 HOURS PRN
Qty: 18 G | Refills: 0 | Status: SHIPPED | OUTPATIENT
Start: 2024-09-16 | End: 2025-09-16

## 2024-09-16 RX ORDER — BACITRACIN ZINC 500 UNIT/G
OINTMENT IN PACKET (EA) TOPICAL ONCE
Status: COMPLETED | OUTPATIENT
Start: 2024-09-16 | End: 2024-09-16

## 2024-09-16 RX ORDER — HYDROXYZINE HYDROCHLORIDE 25 MG/1
25 TABLET, FILM COATED ORAL EVERY 8 HOURS PRN
Qty: 12 TABLET | Refills: 0 | Status: SHIPPED | OUTPATIENT
Start: 2024-09-16 | End: 2024-09-21

## 2024-09-16 RX ORDER — ACETAMINOPHEN 325 MG/1
650 TABLET ORAL EVERY 6 HOURS PRN
Qty: 20 TABLET | Refills: 0 | Status: SHIPPED | OUTPATIENT
Start: 2024-09-16 | End: 2024-09-21

## 2024-09-16 ASSESSMENT — PAIN SCALES - GENERAL
PAINLEVEL_OUTOF10: 0 - NO PAIN
PAINLEVEL_OUTOF10: 0 - NO PAIN

## 2024-09-16 ASSESSMENT — PAIN - FUNCTIONAL ASSESSMENT: PAIN_FUNCTIONAL_ASSESSMENT: 0-10

## 2024-09-16 ASSESSMENT — COLUMBIA-SUICIDE SEVERITY RATING SCALE - C-SSRS
2. HAVE YOU ACTUALLY HAD ANY THOUGHTS OF KILLING YOURSELF?: NO
1. IN THE PAST MONTH, HAVE YOU WISHED YOU WERE DEAD OR WISHED YOU COULD GO TO SLEEP AND NOT WAKE UP?: NO
6. HAVE YOU EVER DONE ANYTHING, STARTED TO DO ANYTHING, OR PREPARED TO DO ANYTHING TO END YOUR LIFE?: NO

## 2024-09-16 NOTE — ED TRIAGE NOTES
States he popped a blister on his ankle and had a wave on pain and tingling. Denies pain currently

## 2024-09-16 NOTE — ED PROVIDER NOTES
HPI   Chief Complaint   Patient presents with    Leg Pain       23-year-old male presents for chief complaints of left foot pain.  States that he had a blister on his heel that popped earlier today.  States that it was initially very painful.  Blister ongoing since yesterday.  He is unsure exactly how it formed.  When it popped he endorsed an improvement in the pain.  However at that point he felt lightheaded and anxious.  He began to get paresthesias in his left lower extremity as well.  States that he felt he was going to pass out but did not.  Denied any chest pain or dyspnea.  Endorses tingling now in his left foot and also bilateral hands.  Denies any weakness.  Denies back pain.  States that he is been stressed lately because he lost his job and no longer has health insurance.  Has been feeling highly anxious because of this.              Patient History   Past Medical History:   Diagnosis Date    Bilateral intraabdominal testes     Bilateral cryptorchidism    Cough, unspecified 2016    Cough    Lower abdominal pain, unspecified     Groin pain    Personal history of (healed) traumatic fracture 2016    History of fracture of clavicle    Personal history of other diseases of the digestive system 2014    History of gastroesophageal reflux (GERD)     , unspecified weeks of gestation ([facility])     Prematurity    Testicular pain, unspecified 2016    Testicular pain    Tinnitus, bilateral 2020    Tinnitus of both ears    Unspecified fracture of unspecified metacarpal bone, initial encounter for closed fracture 2016    Fracture, metacarpal     Past Surgical History:   Procedure Laterality Date    OTHER SURGICAL HISTORY  2014    Surgery Testis    OTHER SURGICAL HISTORY  2014    Surgery Testis Orchiopexy, Inguinal Approach     No family history on file.  Social History     Tobacco Use    Smoking status: Never    Smokeless tobacco: Never   Vaping Use    Vaping  status: Never Used   Substance Use Topics    Alcohol use: Never    Drug use: Never       Physical Exam   ED Triage Vitals [09/16/24 0928]   Temperature Heart Rate Respirations BP   37 °C (98.6 °F) 86 16 128/76      Pulse Ox Temp src Heart Rate Source Patient Position   98 % -- -- --      BP Location FiO2 (%)     -- --       Physical Exam  Vitals and nursing note reviewed.   Constitutional:       General: He is not in acute distress.     Appearance: He is well-developed.   HENT:      Head: Normocephalic and atraumatic.   Eyes:      Conjunctiva/sclera: Conjunctivae normal.   Cardiovascular:      Rate and Rhythm: Normal rate and regular rhythm.      Heart sounds: No murmur heard.  Pulmonary:      Effort: Pulmonary effort is normal. No respiratory distress.      Breath sounds: Normal breath sounds.   Abdominal:      Palpations: Abdomen is soft.      Tenderness: There is no abdominal tenderness.   Musculoskeletal:         General: No swelling.      Cervical back: Neck supple.   Skin:     General: Skin is warm and dry.      Capillary Refill: Capillary refill takes less than 2 seconds.      Comments: Small blister to the heel of the left foot.  No blood.  No purulence.  No erythema.  No warmth.  No swelling.  Motor and pulses intact but patient endorses slight decrease in sensation in the left leg compared to the right lower extremity.  Also endorses tingling in his bilateral hands.   Neurological:      Mental Status: He is alert and oriented to person, place, and time.      GCS: GCS eye subscore is 4. GCS verbal subscore is 5. GCS motor subscore is 6.      Cranial Nerves: Cranial nerves 2-12 are intact.      Sensory: Sensation is intact.      Motor: Motor function is intact.      Coordination: Coordination is intact.      Gait: Gait is intact.      Comments: Intact sensation but with reported decreased sensation in the left foot and also in bilateral hands.   Psychiatric:         Mood and Affect: Mood normal.           ED  Course & MDM   Diagnoses as of 09/18/24 1304   Left foot pain   Anxiety   History of asthma                 No data recorded     Leatha Coma Scale Score: 15 (09/16/24 0927 : Jackie Kee RN)                           Medical Decision Making  Vital signs reviewed, unremarkable at this time.  Patient is well-appearing and in no apparent distress.  Speaks full sentences without difficulty.  Diagnostic testing performed.  Hydroxyzine, Tylenol, bacitracin given for symptom management, including hydroxyzine for likely anxiety given patient's recent high anxiety levels and stress, Tylenol for pain in the left foot on palpation, and bacitracin for the reportedly open blister.  Symptoms likely result of anxiety/stress with recently losing his job and being off of health insurance.  Still basic diagnostics were performed. Twelve-lead EKG shows sinus rhythm with sinus arrhythmia at rate of 79 bpm with , QRS 88, QTc 392.  T wave inversion in lead III consistent with inversion seen in last EKG in August.  No significant changes noted.  No evidence of ischemia.  On reevaluation the patient endorses feeling overall improved.  Glucose within normal ranges at 97.  States that he feels more calm and that the pain has resolved.  Also notes that sensation is improved as well.  Advised to take these meds as directed with prescriptions and to follow-up with primary care.  Advised to return to the ED with any new or worsening symptoms.  Patient did agree with this plan.  Discharged in stable condition.  No focal neurological deficits noted at this time.  Ambulatory with steady gait.        Procedure  Procedures     Shan Rodriguez, AMANDA-CNP  09/16/24 1300       AMANDA Bee-CNP  09/16/24 1301       AMANDA Bee-CNP  09/18/24 1304

## 2024-09-25 ENCOUNTER — HOSPITAL ENCOUNTER (EMERGENCY)
Facility: HOSPITAL | Age: 24
Discharge: HOME | End: 2024-09-25

## 2024-09-25 VITALS
RESPIRATION RATE: 16 BRPM | OXYGEN SATURATION: 99 % | HEART RATE: 94 BPM | DIASTOLIC BLOOD PRESSURE: 82 MMHG | WEIGHT: 150 LBS | TEMPERATURE: 97.9 F | BODY MASS INDEX: 25.61 KG/M2 | HEIGHT: 64 IN | SYSTOLIC BLOOD PRESSURE: 128 MMHG

## 2024-09-25 DIAGNOSIS — R35.0 URINARY FREQUENCY: ICD-10-CM

## 2024-09-25 DIAGNOSIS — Z20.2 STD EXPOSURE: Primary | ICD-10-CM

## 2024-09-25 LAB
APPEARANCE UR: CLEAR
BILIRUB UR STRIP.AUTO-MCNC: NEGATIVE MG/DL
COLOR UR: YELLOW
GLUCOSE UR STRIP.AUTO-MCNC: NORMAL MG/DL
HIV 1+2 AB+HIV1 P24 AG SERPL QL IA: NONREACTIVE
KETONES UR STRIP.AUTO-MCNC: ABNORMAL MG/DL
LEUKOCYTE ESTERASE UR QL STRIP.AUTO: NEGATIVE
NITRITE UR QL STRIP.AUTO: NEGATIVE
PH UR STRIP.AUTO: 6.5 [PH]
PROT UR STRIP.AUTO-MCNC: NEGATIVE MG/DL
RBC # UR STRIP.AUTO: NEGATIVE /UL
SP GR UR STRIP.AUTO: 1.02
TREPONEMA PALLIDUM IGG+IGM AB [PRESENCE] IN SERUM OR PLASMA BY IMMUNOASSAY: NONREACTIVE
UROBILINOGEN UR STRIP.AUTO-MCNC: NORMAL MG/DL

## 2024-09-25 PROCEDURE — 86780 TREPONEMA PALLIDUM: CPT | Performed by: NURSE PRACTITIONER

## 2024-09-25 PROCEDURE — 99283 EMERGENCY DEPT VISIT LOW MDM: CPT

## 2024-09-25 PROCEDURE — 99284 EMERGENCY DEPT VISIT MOD MDM: CPT | Performed by: NURSE PRACTITIONER

## 2024-09-25 PROCEDURE — 87491 CHLMYD TRACH DNA AMP PROBE: CPT | Performed by: NURSE PRACTITIONER

## 2024-09-25 PROCEDURE — 81003 URINALYSIS AUTO W/O SCOPE: CPT | Performed by: NURSE PRACTITIONER

## 2024-09-25 PROCEDURE — 87389 HIV-1 AG W/HIV-1&-2 AB AG IA: CPT | Performed by: NURSE PRACTITIONER

## 2024-09-25 PROCEDURE — 36415 COLL VENOUS BLD VENIPUNCTURE: CPT | Performed by: NURSE PRACTITIONER

## 2024-09-25 PROCEDURE — 87661 TRICHOMONAS VAGINALIS AMPLIF: CPT | Performed by: NURSE PRACTITIONER

## 2024-09-25 ASSESSMENT — PAIN - FUNCTIONAL ASSESSMENT: PAIN_FUNCTIONAL_ASSESSMENT: 0-10

## 2024-09-25 ASSESSMENT — PAIN SCALES - GENERAL: PAINLEVEL_OUTOF10: 0 - NO PAIN

## 2024-09-25 NOTE — ED TRIAGE NOTES
Patient is concerned that he may have been exposed to std and is also concerned that he might have a UTI. Patient states that he is not having any flank pain or burning with urination. Patient does report frequency but a tingling sensation

## 2024-09-26 LAB
C TRACH RRNA SPEC QL NAA+PROBE: NEGATIVE
N GONORRHOEA DNA SPEC QL PROBE+SIG AMP: NEGATIVE
T VAGINALIS RRNA SPEC QL NAA+PROBE: NEGATIVE

## 2024-09-26 NOTE — ED PROVIDER NOTES
HPI   Chief Complaint   Patient presents with    Exposure to STD    Urinary Frequency       Patient is a healthy nontoxic-appearing 23-year-old male with past medical history of adjustment disorder with anxiety and depression, anaphylaxis, asthma, atrophic testicle, cholelithiasis, migraine, epistaxis, presents the emergency room today for complaint of STD exposure.  Patient states he believes he was exposed to STD and wants evaluated for all STDs.  Patient states he also has been having increased urinary frequency with a tingling.  Patient denies any burning with urination, blood in the urine, urgency however.  Patient denies any testicular pain.  Patient denies any abdominal pain, nausea, vomiting or diarrhea or constipation, chest pain, shortness of breath difficulty breathing, fever, shaking, or chills.              Patient History   Past Medical History:   Diagnosis Date    Bilateral intraabdominal testes     Bilateral cryptorchidism    Cough, unspecified 2016    Cough    Lower abdominal pain, unspecified     Groin pain    Personal history of (healed) traumatic fracture 2016    History of fracture of clavicle    Personal history of other diseases of the digestive system 2014    History of gastroesophageal reflux (GERD)     , unspecified weeks of gestation ([facility])     Prematurity    Testicular pain, unspecified 2016    Testicular pain    Tinnitus, bilateral 2020    Tinnitus of both ears    Unspecified fracture of unspecified metacarpal bone, initial encounter for closed fracture 2016    Fracture, metacarpal     Past Surgical History:   Procedure Laterality Date    OTHER SURGICAL HISTORY  2014    Surgery Testis    OTHER SURGICAL HISTORY  2014    Surgery Testis Orchiopexy, Inguinal Approach     No family history on file.  Social History     Tobacco Use    Smoking status: Never    Smokeless tobacco: Never   Vaping Use    Vaping status: Never Used    Substance Use Topics    Alcohol use: Never    Drug use: Never       Physical Exam   ED Triage Vitals [09/25/24 1900]   Temperature Heart Rate Respirations BP   36.6 °C (97.9 °F) 94 16 128/82      Pulse Ox Temp src Heart Rate Source Patient Position   99 % -- -- Lying      BP Location FiO2 (%)     Right arm --       Physical Exam  Vitals and nursing note reviewed. Exam conducted with a chaperone present.   Constitutional:       General: He is not in acute distress.     Appearance: Normal appearance. He is not ill-appearing, toxic-appearing or diaphoretic.   HENT:      Head: Normocephalic.      Nose: Nose normal.      Mouth/Throat:      Mouth: Mucous membranes are moist.   Eyes:      Extraocular Movements: Extraocular movements intact.      Pupils: Pupils are equal, round, and reactive to light.   Cardiovascular:      Rate and Rhythm: Normal rate and regular rhythm.      Pulses: Normal pulses.      Heart sounds: Normal heart sounds. No murmur heard.     No friction rub. No gallop.   Pulmonary:      Effort: Pulmonary effort is normal. No respiratory distress.      Breath sounds: Normal breath sounds. No stridor. No wheezing, rhonchi or rales.   Chest:      Chest wall: No tenderness.   Abdominal:      General: Abdomen is flat. There is no distension.      Palpations: Abdomen is soft. There is no mass.      Tenderness: There is no abdominal tenderness. There is no right CVA tenderness, left CVA tenderness, guarding or rebound.      Hernia: No hernia is present.   Genitourinary:     Penis: Normal.       Testes: Normal.   Musculoskeletal:         General: Normal range of motion.      Cervical back: Normal range of motion and neck supple.   Skin:     General: Skin is warm and dry.      Capillary Refill: Capillary refill takes less than 2 seconds.      Coloration: Skin is not jaundiced or pale.      Findings: No bruising, erythema, lesion or rash.   Neurological:      Mental Status: He is alert.           ED Course & MDM    Diagnoses as of 09/25/24 2130   STD exposure   Urinary frequency                 No data recorded     Leatha Coma Scale Score: 15 (09/25/24 1902 : Radha Levy RN)                           Medical Decision Making  Given patient's complaint presentation a thorough exam was performed patient remains hemodynamically stable during emergency evaluation, is afebrile, no reproducible abdominal tenderness upon palpation, no CVA tenderness upon palpation, testicular exam reveals single testicle that is nontender upon palpation, no penile ulcerations or lesions present, I have a low suspicion for acute abdomen, pyelonephritis, renal calculi, testicular torsion, epididymitis.  Patient has chosen to not be treated empirically today for STDs and would rather wait for cultures.  Patient was informed he will receive a phone call in the next several days if there should be any abnormal growth.  Urinalysis was ordered today as well.  Urinalysis was unremarkable.  I encouraged patient to abstain from intercourse over the next several weeks and monitor symptoms, if they become worse return emergency room for further evaluation, otherwise follow primary care provider as needed.  Patient was agreeable with this plan and discharged home in stable condition.    CHRIS Caban     Portions of this note were generated using digital voice recognition software, and may contain grammatical errors      Procedure  Procedures     CHRIS Caban  09/25/24 2130

## 2024-09-29 ENCOUNTER — HOSPITAL ENCOUNTER (EMERGENCY)
Facility: HOSPITAL | Age: 24
Discharge: HOME | End: 2024-09-29
Attending: STUDENT IN AN ORGANIZED HEALTH CARE EDUCATION/TRAINING PROGRAM
Payer: COMMERCIAL

## 2024-09-29 VITALS
BODY MASS INDEX: 25.61 KG/M2 | TEMPERATURE: 98.6 F | WEIGHT: 150 LBS | HEART RATE: 99 BPM | OXYGEN SATURATION: 98 % | SYSTOLIC BLOOD PRESSURE: 126 MMHG | RESPIRATION RATE: 18 BRPM | HEIGHT: 64 IN | DIASTOLIC BLOOD PRESSURE: 87 MMHG

## 2024-09-29 DIAGNOSIS — N34.2 URETHRITIS: Primary | ICD-10-CM

## 2024-09-29 LAB
APPEARANCE UR: CLEAR
BILIRUB UR STRIP.AUTO-MCNC: NEGATIVE MG/DL
C TRACH RRNA SPEC QL NAA+PROBE: NEGATIVE
COLOR UR: ABNORMAL
GLUCOSE UR STRIP.AUTO-MCNC: NORMAL MG/DL
HOLD SPECIMEN: NORMAL
KETONES UR STRIP.AUTO-MCNC: NEGATIVE MG/DL
LEUKOCYTE ESTERASE UR QL STRIP.AUTO: NEGATIVE
N GONORRHOEA DNA SPEC QL PROBE+SIG AMP: NEGATIVE
NITRITE UR QL STRIP.AUTO: NEGATIVE
PH UR STRIP.AUTO: 8.5 [PH]
PROT UR STRIP.AUTO-MCNC: NEGATIVE MG/DL
RBC # UR STRIP.AUTO: NEGATIVE /UL
SP GR UR STRIP.AUTO: 1.02
T VAGINALIS RRNA SPEC QL NAA+PROBE: NEGATIVE
UROBILINOGEN UR STRIP.AUTO-MCNC: ABNORMAL MG/DL

## 2024-09-29 PROCEDURE — 87661 TRICHOMONAS VAGINALIS AMPLIF: CPT

## 2024-09-29 PROCEDURE — 87491 CHLMYD TRACH DNA AMP PROBE: CPT

## 2024-09-29 PROCEDURE — 81003 URINALYSIS AUTO W/O SCOPE: CPT

## 2024-09-29 PROCEDURE — 96372 THER/PROPH/DIAG INJ SC/IM: CPT | Performed by: STUDENT IN AN ORGANIZED HEALTH CARE EDUCATION/TRAINING PROGRAM

## 2024-09-29 PROCEDURE — 2500000001 HC RX 250 WO HCPCS SELF ADMINISTERED DRUGS (ALT 637 FOR MEDICARE OP): Mod: SE

## 2024-09-29 PROCEDURE — 99283 EMERGENCY DEPT VISIT LOW MDM: CPT | Performed by: STUDENT IN AN ORGANIZED HEALTH CARE EDUCATION/TRAINING PROGRAM

## 2024-09-29 PROCEDURE — 2500000004 HC RX 250 GENERAL PHARMACY W/ HCPCS (ALT 636 FOR OP/ED): Mod: SE | Performed by: STUDENT IN AN ORGANIZED HEALTH CARE EDUCATION/TRAINING PROGRAM

## 2024-09-29 PROCEDURE — 99284 EMERGENCY DEPT VISIT MOD MDM: CPT | Performed by: STUDENT IN AN ORGANIZED HEALTH CARE EDUCATION/TRAINING PROGRAM

## 2024-09-29 RX ORDER — ACETAMINOPHEN 325 MG/1
975 TABLET ORAL ONCE
Status: COMPLETED | OUTPATIENT
Start: 2024-09-29 | End: 2024-09-29

## 2024-09-29 RX ORDER — CEFTRIAXONE 500 MG/1
500 INJECTION, POWDER, FOR SOLUTION INTRAMUSCULAR; INTRAVENOUS ONCE
Status: COMPLETED | OUTPATIENT
Start: 2024-09-29 | End: 2024-09-29

## 2024-09-29 RX ORDER — IBUPROFEN 600 MG/1
600 TABLET ORAL ONCE
Status: COMPLETED | OUTPATIENT
Start: 2024-09-29 | End: 2024-09-29

## 2024-09-29 RX ORDER — DOXYCYCLINE HYCLATE 100 MG
100 TABLET ORAL ONCE
Status: COMPLETED | OUTPATIENT
Start: 2024-09-29 | End: 2024-09-29

## 2024-09-29 RX ORDER — DOXYCYCLINE 100 MG/1
100 TABLET ORAL 2 TIMES DAILY
Qty: 14 TABLET | Refills: 0 | Status: SHIPPED | OUTPATIENT
Start: 2024-09-29 | End: 2024-10-06

## 2024-09-29 RX ADMIN — DOXYCYCLINE HYCLATE 100 MG: 100 TABLET, COATED ORAL at 06:19

## 2024-09-29 RX ADMIN — IBUPROFEN 600 MG: 600 TABLET, FILM COATED ORAL at 05:06

## 2024-09-29 RX ADMIN — ACETAMINOPHEN 975 MG: 325 TABLET ORAL at 05:06

## 2024-09-29 RX ADMIN — CEFTRIAXONE SODIUM 500 MG: 500 INJECTION, POWDER, FOR SOLUTION INTRAMUSCULAR; INTRAVENOUS at 05:57

## 2024-09-29 ASSESSMENT — PAIN DESCRIPTION - FREQUENCY: FREQUENCY: CONSTANT/CONTINUOUS

## 2024-09-29 ASSESSMENT — PAIN DESCRIPTION - DESCRIPTORS: DESCRIPTORS: PINS AND NEEDLES

## 2024-09-29 ASSESSMENT — LIFESTYLE VARIABLES
HAVE PEOPLE ANNOYED YOU BY CRITICIZING YOUR DRINKING: NO
TOTAL SCORE: 0
HAVE YOU EVER FELT YOU SHOULD CUT DOWN ON YOUR DRINKING: NO
EVER FELT BAD OR GUILTY ABOUT YOUR DRINKING: NO
EVER HAD A DRINK FIRST THING IN THE MORNING TO STEADY YOUR NERVES TO GET RID OF A HANGOVER: NO

## 2024-09-29 ASSESSMENT — PAIN - FUNCTIONAL ASSESSMENT: PAIN_FUNCTIONAL_ASSESSMENT: 0-10

## 2024-09-29 ASSESSMENT — PAIN DESCRIPTION - PAIN TYPE: TYPE: ACUTE PAIN

## 2024-09-29 ASSESSMENT — PAIN SCALES - GENERAL: PAINLEVEL_OUTOF10: 8

## 2024-09-29 ASSESSMENT — PAIN DESCRIPTION - LOCATION: LOCATION: PENIS

## 2024-09-29 NOTE — ED PROVIDER NOTES
History of Present Illness   Information Gathering: History collected from patient and chart review    HPI:  Willian Glez is a 23 y.o. male with PMH significant for anxiety and depression, anaphylaxis, asthma, atrophic testicle, cholelithiasis, migraine, epistaxis, presents the emergency room today for urethral pain. Patient was recently seen on 9/25 for identical complaint where STD testing was performed. HIV, syphilis Neisseria gonorrhea chlamydia and trichomonas all negative. Urine positive for  6-10 WBCs but otherwise negative. Patient was discharged home as a result with return precautions. Patient states that since discharge, symptoms have not improved and he still has pain primarily at the tip of his penis. Pain is significantly worse whenever he urinates and patient states that he has been urinating more often than usual. Denies lower abdominal pain. Denies pain in the scrotum, groin pubis or shaft of his penis. Denies lesions ulcerations lacerations or abrasions. Denies trauma to the area. Denies abnormal discharge from the penis. States that he has been with multiple partners that have denied any known diseases.    Physical Exam   Triage vitals:  T 37 °C (98.6 °F)  HR 99  /87  RR 18  O2 98 % None (Room air)    Physical Exam  Constitutional:       Appearance: Normal appearance.   HENT:      Head: Normocephalic and atraumatic.   Eyes:      Extraocular Movements: Extraocular movements intact.      Pupils: Pupils are equal, round, and reactive to light.   Cardiovascular:      Rate and Rhythm: Normal rate and regular rhythm.   Pulmonary:      Effort: Pulmonary effort is normal.      Breath sounds: Normal breath sounds.   Abdominal:      General: Abdomen is flat.      Palpations: Abdomen is soft.   Genitourinary:     Comments: Penis appears normal. No erythema, lacerations or irritation at the urethral meatus. No ulcerations, vesicular lesions or other abnormal skin findings along the shaft of the  penis, pubis groin or scrotum. Testicles are firm and nontender to palpation bilaterally. No swelling or erythema. Findings grossly normal  Musculoskeletal:         General: No swelling or signs of injury. Normal range of motion.   Neurological:      General: No focal deficit present.      Mental Status: He is alert and oriented to person, place, and time.         Medical Decision Making & ED Course   Medical Decision Makin y.o. male with PMH significant for anxiety and depression, anaphylaxis, asthma, atrophic testicle, cholelithiasis, migraine, epistaxis, presents the emergency room today for urethral pain. On presentation, patient is hemodynamically stable, afebrile and saturating well on room air without signs of increased respiratory effort. Physical exam reveals no external findings of pathology including differential such as herpes simplex virus, HPV or other cutaneous manifestations of infectious disease. Differential also included but not limited to penile trauma, urethral irritation, contact dermatitis, nephrolithiasis. Patient pain treated with 600 mg ibuprofen and 975 mg of acetaminophen. Urinalysis completed showing no evidence of UTI. Repeat STD testing collected. Given that patient laboratory findings negative but with continued symptoms of urethritis, discussed with patient and will treat empirically. Patient given 500 mg IM injection of ceftriaxone and discharged home with 7 days worth of 100 mg doxycycline twice daily after receiving first dose here in the emergency department. Patient given return precautions and discharged home in stable condition.    ED Course:  Diagnoses as of 24 0817   Urethritis       ----  Independent Result Review and Interpretation: Relevant laboratory and radiographic results were reviewed and independently interpreted by myself.  As necessary, they are commented on in the ED Course.    Chronic conditions affecting the patient's care: As documented above in  MDM    The patient was discussed with the following consultants/services: As described in MDM      Disposition   As a result of the work-up, the patient was discharged home.  he was informed of his diagnosis and instructed to come back with any concerns or worsening of condition.  he and was agreeable to the plan as discussed above.  he was given the opportunity to ask questions.  All of the patient's questions were answered.    Procedures   Procedures    Patient seen and discussed with ED attending physician.    Gabriel Shah MD  Emergency Medicine, PGY-2      Luciano Shah MD  Resident  09/29/24 8412

## 2024-09-29 NOTE — ED TRIAGE NOTES
Patient reports that he is having increased urinary frequency. Also reports pins/needles feeling at the tip of his penis. States he was seen on the 25th and checked for a UTI and STDs.

## 2024-10-09 ENCOUNTER — APPOINTMENT (OUTPATIENT)
Dept: UROLOGY | Facility: CLINIC | Age: 24
End: 2024-10-09
Payer: COMMERCIAL

## 2024-10-10 ENCOUNTER — APPOINTMENT (OUTPATIENT)
Dept: UROLOGY | Facility: CLINIC | Age: 24
End: 2024-10-10
Payer: COMMERCIAL

## 2024-10-25 ENCOUNTER — APPOINTMENT (OUTPATIENT)
Dept: RADIOLOGY | Facility: HOSPITAL | Age: 24
End: 2024-10-25

## 2024-10-25 ENCOUNTER — HOSPITAL ENCOUNTER (EMERGENCY)
Facility: HOSPITAL | Age: 24
Discharge: HOME | End: 2024-10-26
Attending: EMERGENCY MEDICINE

## 2024-10-25 ENCOUNTER — CLINICAL SUPPORT (OUTPATIENT)
Dept: EMERGENCY MEDICINE | Facility: HOSPITAL | Age: 24
End: 2024-10-25

## 2024-10-25 VITALS
OXYGEN SATURATION: 96 % | TEMPERATURE: 97.5 F | DIASTOLIC BLOOD PRESSURE: 83 MMHG | BODY MASS INDEX: 25.61 KG/M2 | WEIGHT: 150 LBS | RESPIRATION RATE: 18 BRPM | HEIGHT: 64 IN | SYSTOLIC BLOOD PRESSURE: 131 MMHG | HEART RATE: 77 BPM

## 2024-10-25 DIAGNOSIS — J06.9 UPPER RESPIRATORY TRACT INFECTION, UNSPECIFIED TYPE: ICD-10-CM

## 2024-10-25 DIAGNOSIS — R05.1 ACUTE COUGH: Primary | ICD-10-CM

## 2024-10-25 DIAGNOSIS — K05.30 PERICORONITIS: ICD-10-CM

## 2024-10-25 LAB
FLUAV RNA RESP QL NAA+PROBE: NOT DETECTED
FLUBV RNA RESP QL NAA+PROBE: NOT DETECTED
SARS-COV-2 RNA RESP QL NAA+PROBE: NOT DETECTED

## 2024-10-25 PROCEDURE — 94640 AIRWAY INHALATION TREATMENT: CPT

## 2024-10-25 PROCEDURE — 2500000002 HC RX 250 W HCPCS SELF ADMINISTERED DRUGS (ALT 637 FOR MEDICARE OP, ALT 636 FOR OP/ED): Mod: SE | Performed by: PHYSICIAN ASSISTANT

## 2024-10-25 PROCEDURE — 93005 ELECTROCARDIOGRAM TRACING: CPT

## 2024-10-25 PROCEDURE — 87502 INFLUENZA DNA AMP PROBE: CPT | Performed by: PHYSICIAN ASSISTANT

## 2024-10-25 PROCEDURE — 99283 EMERGENCY DEPT VISIT LOW MDM: CPT | Mod: 25

## 2024-10-25 PROCEDURE — 71046 X-RAY EXAM CHEST 2 VIEWS: CPT

## 2024-10-25 PROCEDURE — 71046 X-RAY EXAM CHEST 2 VIEWS: CPT | Mod: FOREIGN READ | Performed by: RADIOLOGY

## 2024-10-25 RX ORDER — ALBUTEROL SULFATE 0.83 MG/ML
2.5 SOLUTION RESPIRATORY (INHALATION) ONCE
Status: COMPLETED | OUTPATIENT
Start: 2024-10-25 | End: 2024-10-25

## 2024-10-25 RX ORDER — CHLORHEXIDINE GLUCONATE ORAL RINSE 1.2 MG/ML
15 SOLUTION DENTAL AS NEEDED
Qty: 120 ML | Refills: 0 | Status: SHIPPED | OUTPATIENT
Start: 2024-10-25 | End: 2024-10-26

## 2024-10-25 ASSESSMENT — PAIN SCALES - GENERAL: PAINLEVEL_OUTOF10: 5 - MODERATE PAIN

## 2024-10-25 ASSESSMENT — PAIN DESCRIPTION - LOCATION: LOCATION: CHEST

## 2024-10-25 ASSESSMENT — PAIN - FUNCTIONAL ASSESSMENT: PAIN_FUNCTIONAL_ASSESSMENT: 0-10

## 2024-10-25 ASSESSMENT — PAIN DESCRIPTION - DESCRIPTORS: DESCRIPTORS: TIGHTNESS

## 2024-10-25 ASSESSMENT — PAIN DESCRIPTION - PAIN TYPE: TYPE: ACUTE PAIN

## 2024-10-25 NOTE — Clinical Note
Willian Newton was seen and treated in our emergency department on 10/25/2024.  He may return to work on 10/28/2024.       If you have any questions or concerns, please don't hesitate to call.      Ursula Henriquez PA-C

## 2024-10-26 LAB
ATRIAL RATE: 83 BPM
P AXIS: 53 DEGREES
P OFFSET: 205 MS
P ONSET: 159 MS
PR INTERVAL: 124 MS
Q ONSET: 221 MS
QRS COUNT: 13 BEATS
QRS DURATION: 88 MS
QT INTERVAL: 344 MS
QTC CALCULATION(BAZETT): 404 MS
QTC FREDERICIA: 383 MS
R AXIS: 74 DEGREES
T AXIS: 35 DEGREES
T OFFSET: 393 MS
VENTRICULAR RATE: 83 BPM

## 2024-10-26 RX ORDER — CHLORHEXIDINE GLUCONATE ORAL RINSE 1.2 MG/ML
15 SOLUTION DENTAL 3 TIMES DAILY
Qty: 120 ML | Refills: 0 | Status: SHIPPED | OUTPATIENT
Start: 2024-10-26

## 2024-10-26 RX ORDER — ALBUTEROL SULFATE 90 UG/1
2 INHALANT RESPIRATORY (INHALATION) EVERY 4 HOURS PRN
Qty: 18 G | Refills: 0 | Status: SHIPPED | OUTPATIENT
Start: 2024-10-26 | End: 2024-11-25

## 2024-10-26 NOTE — PROGRESS NOTES
Emergency Medicine Transition of Care Note.    I received Willian Glez in signout from previous team.  Please see the previous ED provider note for all HPI, PE and MDM up to the time of signout at 2300. This is in addition to the primary record.    In brief Willian Glez is an 23 y.o. male presenting for uri sx  At the time of signout we were awaiting: Viral swabs and chest x-ray.  Chest x-ray showed no acute cardiopulmonary process.  Viral swabs negative.  These results were discussed with the patient.  He was written a prescription for Peridex dental wash for his pericoronitis as well as a new albuterol inhaler.  He was discharged from the ED in stable condition.  He was written a note for his job.  He was advised to follow with his primary care provider.    ED Course as of 10/26/24 0003   Fri Oct 25, 2024   2224 ECG shows normal sinus rhythm with a rate of 83 bpm.  Early repolarization with J-point.  QT/QTc 344/404.  Normal axis and intervals.  No STEMI/NSTEMI.  Similar when compared to ECG obtained on September 16, 2024. [HK]      ED Course User Index  [HK] Luyc Ash PA-C         Diagnoses as of 10/26/24 0003   Acute cough   Pericoronitis   Upper respiratory tract infection, unspecified type       Labs Reviewed   SARS-COV-2 PCR - Normal       Result Value    Coronavirus 2019, PCR Not Detected      Narrative:     This assay has received FDA Emergency Use Authorization (EUA) and is only authorized for the duration of time that circumstances exist to justify the authorization of the emergency use of in vitro diagnostic tests for the detection of SARS-CoV-2 virus and/or diagnosis of COVID-19 infection under section 564(b)(1) of the Act, 21 U.S.C. 360bbb-3(b)(1). This assay is an in vitro diagnostic nucleic acid amplification test for the qualitative detection of SARS-CoV-2 from nasopharyngeal specimens and has been validated for use at [facility]. Negative results do not  preclude COVID-19 infections and should not be used as the sole basis for diagnosis, treatment, or other management decisions.     INFLUENZA A AND B PCR - Normal    Flu A Result Not Detected      Flu B Result Not Detected      Narrative:     This assay is an in vitro diagnostic multiplex nucleic acid amplification test for the detection and discrimination of Influenza A & B from nasopharyngeal specimens, and has been validated for use at [facility]. Negative results do not preclude Influenza A/B infections, and should not be used as the sole basis for diagnosis, treatment, or other management decisions. If Influenza A/B and RSV PCR results are negative, testing for Parainfluenza virus, Adenovirus and Metapneumovirus is routinely performed for [facility] pediatric oncology and intensive care inpatients, and is available on other patients by placing an add-on request.       XR chest 2 views   Final Result   No acute cardiopulmonary disease.   Signed by Ken Alvarado            Medical Decision Making      Final diagnoses:   [R05.1] Acute cough   [K05.30] Pericoronitis   [J06.9] Upper respiratory tract infection, unspecified type           Procedure  Procedures    Ursula Henriquez PA-C

## 2024-10-26 NOTE — ED PROVIDER NOTES
HPI   Chief Complaint   Patient presents with    Flu Symptoms    Chest Pain       HPI  Patient is a 23-year-old male past medical history of asthma that presents to the ED for evaluation of flulike symptoms. Patient reports for the last 2 days he has been experiencing a cough which initially had brownish-red sputum and now is yellow.  Body aches, a frontal headache, mouth sores that are tender, dry sinuses and sore throat.  He also endorses chest pain with coughing.  Patient reports he feels short of breath like an asthma flareup.  Denies fever, chills, nausea, vomiting, dysuria, hematuria, diarrhea.       Patient History   Past Medical History:   Diagnosis Date    Bilateral intraabdominal testes     Bilateral cryptorchidism    Cough, unspecified 2016    Cough    Lower abdominal pain, unspecified     Groin pain    Personal history of (healed) traumatic fracture 2016    History of fracture of clavicle    Personal history of other diseases of the digestive system 2014    History of gastroesophageal reflux (GERD)     , unspecified weeks of gestation ([facility])     Prematurity    Testicular pain, unspecified 2016    Testicular pain    Tinnitus, bilateral 2020    Tinnitus of both ears    Unspecified fracture of unspecified metacarpal bone, initial encounter for closed fracture 2016    Fracture, metacarpal     Past Surgical History:   Procedure Laterality Date    OTHER SURGICAL HISTORY  2014    Surgery Testis    OTHER SURGICAL HISTORY  2014    Surgery Testis Orchiopexy, Inguinal Approach     No family history on file.  Social History     Tobacco Use    Smoking status: Never    Smokeless tobacco: Never   Vaping Use    Vaping status: Never Used   Substance Use Topics    Alcohol use: Never    Drug use: Never       Physical Exam   ED Triage Vitals [10/25/24 2120]   Temperature Heart Rate Respirations BP   36.4 °C (97.5 °F) 77 18 131/83      Pulse Ox Temp Source  Heart Rate Source Patient Position   96 % Temporal -- --      BP Location FiO2 (%)     -- --       Physical Exam  Vitals reviewed.   Constitutional:       General: He is not in acute distress.     Appearance: He is well-developed. He is not ill-appearing.   Eyes:      Extraocular Movements: Extraocular movements intact.   Neck:      Trachea: No tracheal deviation.   Cardiovascular:      Rate and Rhythm: Normal rate and regular rhythm.      Heart sounds: Normal heart sounds.   Pulmonary:      Effort: Pulmonary effort is normal. No tachypnea, accessory muscle usage or respiratory distress.      Breath sounds: No stridor. Wheezing present. No rhonchi or rales.   Chest:      Chest wall: Tenderness present. No crepitus.      Comments: Reproducible chest wall tenderness  Musculoskeletal:      Cervical back: Neck supple.   Skin:     General: Skin is warm and dry.      Capillary Refill: Capillary refill takes less than 2 seconds.      Coloration: Skin is not cyanotic or pale.   Neurological:      General: No focal deficit present.      Mental Status: He is alert.   Psychiatric:         Mood and Affect: Mood normal.         Behavior: Behavior normal.           ED Course & MDM   ED Course as of 10/25/24 2234   Fri Oct 25, 2024   2224 ECG shows normal sinus rhythm with a rate of 83 bpm.  Early repolarization with J-point.  QT/QTc 344/404.  Normal axis and intervals.  No STEMI/NSTEMI.  Similar when compared to ECG obtained on September 16, 2024. [HK]      ED Course User Index  [HK] Lucy Ash PA-C         Diagnoses as of 10/25/24 2234   Acute cough   Pericoronitis                 No data recorded                                 Medical Decision Making  Patient is a 23-year-old male past medical history of asthma that presents to the ED for evaluation of flulike symptoms.  On exam patient is well-appearing in no acute distress.  Vital signs are stable.  Discal exam is significant for mild wheezing on auscultation.   Patient appears to have pericoronitis by right wisdom teeth, will send home with Chlorex mouthwash.  Does not meet center criteria for strep swab today.  Differential includes but is not limited to asthma exacerbation, bronchitis, pneumonia, COVID/flu.    Rapid COVID flu ordered.  Chest x-ray ordered.  Nebulized albuterol treatment administered.    Chest pain is reproducible and does not follow ischemic pattern.  ECG shows normal sinus rhythm with a rate of 83 bpm.  Early repolarization with J-point.  QT/QTc 344/404.  Normal axis and intervals.  No STEMI/NSTEMI.  Similar when compared to ECG obtained on September 16, 2024.    Patient signed out to fellow NURIS Ursula Henriquez PA-C.  Disposition is likely discharge.  Pending chest x-ray and reevaluation.         Procedure  Procedures     Lucy Ash PA-C  10/25/24 2836

## 2024-11-01 ENCOUNTER — HOSPITAL ENCOUNTER (EMERGENCY)
Facility: HOSPITAL | Age: 24
Discharge: HOME | End: 2024-11-01

## 2024-11-01 VITALS
HEART RATE: 99 BPM | RESPIRATION RATE: 18 BRPM | SYSTOLIC BLOOD PRESSURE: 131 MMHG | BODY MASS INDEX: 25.61 KG/M2 | TEMPERATURE: 97.5 F | DIASTOLIC BLOOD PRESSURE: 80 MMHG | OXYGEN SATURATION: 98 % | WEIGHT: 150 LBS | HEIGHT: 64 IN

## 2024-11-01 DIAGNOSIS — Z11.3 SCREENING EXAMINATION FOR STD (SEXUALLY TRANSMITTED DISEASE): Primary | ICD-10-CM

## 2024-11-01 LAB
APPEARANCE UR: CLEAR
BILIRUB UR STRIP.AUTO-MCNC: NEGATIVE MG/DL
C TRACH RRNA SPEC QL NAA+PROBE: NEGATIVE
COLOR UR: ABNORMAL
GLUCOSE UR STRIP.AUTO-MCNC: NORMAL MG/DL
KETONES UR STRIP.AUTO-MCNC: ABNORMAL MG/DL
LEUKOCYTE ESTERASE UR QL STRIP.AUTO: NEGATIVE
MUCOUS THREADS #/AREA URNS AUTO: NORMAL /LPF
N GONORRHOEA DNA SPEC QL PROBE+SIG AMP: NEGATIVE
NITRITE UR QL STRIP.AUTO: NEGATIVE
PH UR STRIP.AUTO: 7.5 [PH]
PROT UR STRIP.AUTO-MCNC: ABNORMAL MG/DL
RBC # UR STRIP.AUTO: NEGATIVE /UL
RBC #/AREA URNS AUTO: NORMAL /HPF
SP GR UR STRIP.AUTO: 1.03
T VAGINALIS RRNA SPEC QL NAA+PROBE: NEGATIVE
UROBILINOGEN UR STRIP.AUTO-MCNC: NORMAL MG/DL
WBC #/AREA URNS AUTO: NORMAL /HPF

## 2024-11-01 PROCEDURE — 87661 TRICHOMONAS VAGINALIS AMPLIF: CPT | Performed by: NURSE PRACTITIONER

## 2024-11-01 PROCEDURE — 99284 EMERGENCY DEPT VISIT MOD MDM: CPT | Performed by: NURSE PRACTITIONER

## 2024-11-01 PROCEDURE — 99283 EMERGENCY DEPT VISIT LOW MDM: CPT | Performed by: NURSE PRACTITIONER

## 2024-11-01 PROCEDURE — 87491 CHLMYD TRACH DNA AMP PROBE: CPT | Performed by: NURSE PRACTITIONER

## 2024-11-01 PROCEDURE — 81001 URINALYSIS AUTO W/SCOPE: CPT | Performed by: NURSE PRACTITIONER

## 2024-11-01 ASSESSMENT — PAIN - FUNCTIONAL ASSESSMENT: PAIN_FUNCTIONAL_ASSESSMENT: 0-10

## 2024-11-01 ASSESSMENT — COLUMBIA-SUICIDE SEVERITY RATING SCALE - C-SSRS
2. HAVE YOU ACTUALLY HAD ANY THOUGHTS OF KILLING YOURSELF?: NO
6. HAVE YOU EVER DONE ANYTHING, STARTED TO DO ANYTHING, OR PREPARED TO DO ANYTHING TO END YOUR LIFE?: NO
1. IN THE PAST MONTH, HAVE YOU WISHED YOU WERE DEAD OR WISHED YOU COULD GO TO SLEEP AND NOT WAKE UP?: NO

## 2024-11-01 ASSESSMENT — PAIN SCALES - GENERAL: PAINLEVEL_OUTOF10: 0 - NO PAIN

## 2024-11-11 ENCOUNTER — APPOINTMENT (OUTPATIENT)
Dept: RADIOLOGY | Facility: HOSPITAL | Age: 24
End: 2024-11-11

## 2024-11-11 ENCOUNTER — HOSPITAL ENCOUNTER (EMERGENCY)
Facility: HOSPITAL | Age: 24
Discharge: HOME | End: 2024-11-12
Attending: STUDENT IN AN ORGANIZED HEALTH CARE EDUCATION/TRAINING PROGRAM

## 2024-11-11 VITALS
RESPIRATION RATE: 18 BRPM | BODY MASS INDEX: 25.75 KG/M2 | WEIGHT: 150 LBS | SYSTOLIC BLOOD PRESSURE: 143 MMHG | OXYGEN SATURATION: 95 % | DIASTOLIC BLOOD PRESSURE: 84 MMHG | HEART RATE: 97 BPM

## 2024-11-11 DIAGNOSIS — J45.909 UNSPECIFIED ASTHMA, UNCOMPLICATED (HHS-HCC): ICD-10-CM

## 2024-11-11 DIAGNOSIS — J45.901 ASTHMA EXACERBATION, MILD (HHS-HCC): Primary | ICD-10-CM

## 2024-11-11 PROCEDURE — 71046 X-RAY EXAM CHEST 2 VIEWS: CPT

## 2024-11-11 PROCEDURE — 80053 COMPREHEN METABOLIC PANEL: CPT

## 2024-11-11 PROCEDURE — 99283 EMERGENCY DEPT VISIT LOW MDM: CPT | Mod: 25

## 2024-11-11 PROCEDURE — 94640 AIRWAY INHALATION TREATMENT: CPT

## 2024-11-11 PROCEDURE — 36415 COLL VENOUS BLD VENIPUNCTURE: CPT | Performed by: STUDENT IN AN ORGANIZED HEALTH CARE EDUCATION/TRAINING PROGRAM

## 2024-11-11 PROCEDURE — 2500000001 HC RX 250 WO HCPCS SELF ADMINISTERED DRUGS (ALT 637 FOR MEDICARE OP): Mod: SE

## 2024-11-11 PROCEDURE — 85025 COMPLETE CBC W/AUTO DIFF WBC: CPT

## 2024-11-11 PROCEDURE — 71046 X-RAY EXAM CHEST 2 VIEWS: CPT | Performed by: RADIOLOGY

## 2024-11-11 PROCEDURE — 83735 ASSAY OF MAGNESIUM: CPT

## 2024-11-11 PROCEDURE — 2500000002 HC RX 250 W HCPCS SELF ADMINISTERED DRUGS (ALT 637 FOR MEDICARE OP, ALT 636 FOR OP/ED): Mod: SE

## 2024-11-11 PROCEDURE — 87636 SARSCOV2 & INF A&B AMP PRB: CPT

## 2024-11-11 RX ORDER — IPRATROPIUM BROMIDE AND ALBUTEROL SULFATE 2.5; .5 MG/3ML; MG/3ML
3 SOLUTION RESPIRATORY (INHALATION) ONCE
Status: COMPLETED | OUTPATIENT
Start: 2024-11-11 | End: 2024-11-11

## 2024-11-11 RX ORDER — CETIRIZINE HYDROCHLORIDE 10 MG/1
10 TABLET ORAL ONCE
Status: COMPLETED | OUTPATIENT
Start: 2024-11-11 | End: 2024-11-11

## 2024-11-11 ASSESSMENT — COLUMBIA-SUICIDE SEVERITY RATING SCALE - C-SSRS
6. HAVE YOU EVER DONE ANYTHING, STARTED TO DO ANYTHING, OR PREPARED TO DO ANYTHING TO END YOUR LIFE?: NO
2. HAVE YOU ACTUALLY HAD ANY THOUGHTS OF KILLING YOURSELF?: NO
1. IN THE PAST MONTH, HAVE YOU WISHED YOU WERE DEAD OR WISHED YOU COULD GO TO SLEEP AND NOT WAKE UP?: NO

## 2024-11-12 LAB
ALBUMIN SERPL BCP-MCNC: 4.5 G/DL (ref 3.4–5)
ALP SERPL-CCNC: 52 U/L (ref 33–120)
ALT SERPL W P-5'-P-CCNC: 18 U/L (ref 10–52)
ANION GAP SERPL CALC-SCNC: 14 MMOL/L (ref 10–20)
AST SERPL W P-5'-P-CCNC: 20 U/L (ref 9–39)
BASOPHILS # BLD AUTO: 0.05 X10*3/UL (ref 0–0.1)
BASOPHILS NFR BLD AUTO: 0.7 %
BILIRUB SERPL-MCNC: 0.5 MG/DL (ref 0–1.2)
BUN SERPL-MCNC: 10 MG/DL (ref 6–23)
CALCIUM SERPL-MCNC: 9.1 MG/DL (ref 8.6–10.6)
CHLORIDE SERPL-SCNC: 103 MMOL/L (ref 98–107)
CO2 SERPL-SCNC: 26 MMOL/L (ref 21–32)
CREAT SERPL-MCNC: 0.98 MG/DL (ref 0.5–1.3)
EGFRCR SERPLBLD CKD-EPI 2021: >90 ML/MIN/1.73M*2
EOSINOPHIL # BLD AUTO: 0.68 X10*3/UL (ref 0–0.7)
EOSINOPHIL NFR BLD AUTO: 9.8 %
ERYTHROCYTE [DISTWIDTH] IN BLOOD BY AUTOMATED COUNT: 13.6 % (ref 11.5–14.5)
FLUAV RNA RESP QL NAA+PROBE: NOT DETECTED
FLUBV RNA RESP QL NAA+PROBE: NOT DETECTED
GLUCOSE SERPL-MCNC: 119 MG/DL (ref 74–99)
HCT VFR BLD AUTO: 41.2 % (ref 41–52)
HGB BLD-MCNC: 13.7 G/DL (ref 13.5–17.5)
HOLD SPECIMEN: NORMAL
HOLD SPECIMEN: NORMAL
IMM GRANULOCYTES # BLD AUTO: 0.01 X10*3/UL (ref 0–0.7)
IMM GRANULOCYTES NFR BLD AUTO: 0.1 % (ref 0–0.9)
LYMPHOCYTES # BLD AUTO: 2.28 X10*3/UL (ref 1.2–4.8)
LYMPHOCYTES NFR BLD AUTO: 32.9 %
MAGNESIUM SERPL-MCNC: 2.93 MG/DL (ref 1.6–2.4)
MCH RBC QN AUTO: 25.2 PG (ref 26–34)
MCHC RBC AUTO-ENTMCNC: 33.3 G/DL (ref 32–36)
MCV RBC AUTO: 76 FL (ref 80–100)
MONOCYTES # BLD AUTO: 0.44 X10*3/UL (ref 0.1–1)
MONOCYTES NFR BLD AUTO: 6.3 %
NEUTROPHILS # BLD AUTO: 3.47 X10*3/UL (ref 1.2–7.7)
NEUTROPHILS NFR BLD AUTO: 50.2 %
NRBC BLD-RTO: 0 /100 WBCS (ref 0–0)
PLATELET # BLD AUTO: 196 X10*3/UL (ref 150–450)
POTASSIUM SERPL-SCNC: 4.1 MMOL/L (ref 3.5–5.3)
PROT SERPL-MCNC: 7.2 G/DL (ref 6.4–8.2)
RBC # BLD AUTO: 5.44 X10*6/UL (ref 4.5–5.9)
SARS-COV-2 RNA RESP QL NAA+PROBE: NOT DETECTED
SODIUM SERPL-SCNC: 139 MMOL/L (ref 136–145)
WBC # BLD AUTO: 6.9 X10*3/UL (ref 4.4–11.3)

## 2024-11-12 PROCEDURE — 2500000001 HC RX 250 WO HCPCS SELF ADMINISTERED DRUGS (ALT 637 FOR MEDICARE OP)

## 2024-11-12 RX ORDER — PREDNISONE 10 MG/1
40 TABLET ORAL DAILY
Qty: 20 TABLET | Refills: 0 | Status: SHIPPED | OUTPATIENT
Start: 2024-11-12 | End: 2024-11-17

## 2024-11-12 RX ORDER — ALBUTEROL SULFATE 90 UG/1
2 INHALANT RESPIRATORY (INHALATION) ONCE
Status: COMPLETED | OUTPATIENT
Start: 2024-11-12 | End: 2024-11-12

## 2024-11-12 RX ORDER — MOMETASONE FUROATE AND FORMOTEROL FUMARATE DIHYDRATE 200; 5 UG/1; UG/1
2 AEROSOL RESPIRATORY (INHALATION) 2 TIMES DAILY
Qty: 13 G | Refills: 0 | Status: SHIPPED | OUTPATIENT
Start: 2024-11-12

## 2024-11-12 RX ORDER — ALBUTEROL SULFATE 90 UG/1
2 INHALANT RESPIRATORY (INHALATION) EVERY 4 HOURS PRN
Qty: 18 G | Refills: 0 | Status: SHIPPED | OUTPATIENT
Start: 2024-11-12 | End: 2024-12-12

## 2024-11-12 NOTE — ED PROVIDER NOTES
Emergency Department Provider Note        History of Present Illness     History provided by: Patient  Limitations to History: None  External Records Reviewed with Brief Summary: None    HPI:  Willian Glez is a 24 y.o. male past medical history of asthma, eczema, adjustment disorder, migraine presenting with a chief complaint of shortness of breath.  Patient states this is been happening for the last month as he was kicked off his insurance.  Previously he was taking Dulera and a rescue inhaler which she has been out of for the last month as he was kicked off his insurance.  He states since then he has had intermittent wheezing, chest tightness and a cough.  No known sick contacts no fevers.  Patient does have rhinorrhea but states he does not take anything for allergies daily.  Patient received DuoNebs, mag, Solu-Medrol en route and states he feels a little bit better but is still endorsing chest tightness.    Physical Exam   Triage vitals:  T    HR 97  /84  RR 18  O2 95 % None (Room air)    General: Awake, alert, in no acute distress  Eyes: Gaze conjugate.  No scleral icterus or injection  HENT: Normo-cephalic, atraumatic. No stridor.  Clear rhinorrhea.  CV: Regular rate, regular rhythm. Radial pulses 2+ bilaterally  Resp: Breathing non-labored, speaking in full sentences.  Bilateral expiratory wheezing in both lungs  GI: Soft, non-distended, non-tender. No rebound or guarding.  MSK/Extremities: No gross bony deformities. Moving all extremities  Skin: Warm. Appropriate color  Neuro: Alert. Oriented. Face symmetric. Speech is fluent.  Gross strength and sensation intact in b/l UE and LEs  Psych: Appropriate mood and affect    Medical Decision Making & ED Course   Medical Decision Makin y.o. male with known history of asthma presenting with a chief complaint of a month of intermittent shortness of breath consistent with previous asthma exacerbations.  On arrival patient is hemodynamically stable,  saturating appropriately on room air in no acute distress.  He does have clear rhinorrhea and diffuse expiratory wheezing.  Already treated with Mag, Solu-Medrol via EMS.  Given he is still having wheezing ordered 1 additional DuoNeb, chest x-ray COVID flu swabs and basic labs.    After my attending evaluated the patient, he requested discharge home as he felt better after his additional DuoNeb.  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  Given his vitals are stable this is appropriate.  I did order the patient an MDI so he can take this home as he is had difficulty obtaining medications and I refilled his other asthma medications.  Per chart review patient has not seen a pulmonologist for some time so I did send him a referral for this.  Instructed to follow-up if symptoms are to recur or worsen.  ----      Differential diagnoses considered include but are not limited to: URI, allergic rhinitis, asthma     Social Determinants of Health which Significantly Impact Care: None identified     EKG Independent Interpretation: EKG interpreted by myself. Please see ED Course for full interpretation.    Independent Result Review and Interpretation: Relevant laboratory and radiographic results were reviewed and independently interpreted by myself.  As necessary, they are commented on in the ED Course.    Chronic conditions affecting the patient's care: As documented above in [facility]    The patient was discussed with the following consultants/services: None    Care Considerations: As documented above in [facility]    ED Course:  ED Course as of 11/12/24 0048   Mon Nov 11, 2024   2354 XR chest 2 views  X-ray independently interpreted myself without focal consolidation, pneumothorax, effusion.  No cardiomegaly noted. [AW]      ED Course User Index  [AW] Ursula Leyva DO         Diagnoses as of 11/12/24 0048   Asthma exacerbation, mild ([facility])     Disposition   As a result of the work-up, the patient was discharged home.   he was informed of his diagnosis and instructed to come back with any concerns or worsening of condition.  he and was agreeable to the plan as discussed above.  he was given the opportunity to ask questions.  All of the patient's questions were answered.    Procedures   Procedures    Patient seen and discussed with ED attending physician.    Ursula Leyva DO  Emergency Medicine       Ursula Leyva DO  Resident  11/12/24 0048

## 2024-11-12 NOTE — ED TRIAGE NOTES
Patient presents to the ED for SOB and asthma exacurbation. Pt states he has been coughing for almost a month and was producing yellow sputum. Pt says he ran out of his inhaler. Pt was given 3 duonebs, 125 of solumedrol and 2 g of Mag by EMS before arriving to the hospital.

## 2024-11-15 ENCOUNTER — DOCUMENTATION (OUTPATIENT)
Dept: EMERGENCY MEDICINE | Facility: HOSPITAL | Age: 24
End: 2024-11-15

## 2024-11-15 NOTE — PROGRESS NOTES
11/15/2024     Test:   Discussed HIV and HCV testing with the patient in the ED.   Patient received HIV and HCV test today    Test Result:  HIV Negative  HCV Negative  SYPHILIS Negative    Result notification:  Patient was notified of their test results on 11/15/24 via Telephone (after verifying 3 identifiers)    Follow-up plan:   Patient was referred to Other on [Date]  Patient has appointment at [clinic name] on [Date]  Barriers to attending appointment: [free text, none]  Missed appointments: [unfilled]     Signed  NARGIS Morales   HIV/HCV [facility]  Certified Community Health Worker - Research  Patient was tested on 09-  Please contact me via email or Secure Chat or phone [phone] with questions

## 2024-11-16 ENCOUNTER — APPOINTMENT (OUTPATIENT)
Dept: RADIOLOGY | Facility: HOSPITAL | Age: 24
End: 2024-11-16

## 2024-11-16 ENCOUNTER — HOSPITAL ENCOUNTER (EMERGENCY)
Facility: HOSPITAL | Age: 24
Discharge: HOME | End: 2024-11-17
Attending: EMERGENCY MEDICINE

## 2024-11-16 DIAGNOSIS — L73.9 FOLLICULITIS: ICD-10-CM

## 2024-11-16 DIAGNOSIS — Z20.2 POSSIBLE EXPOSURE TO STD: Primary | ICD-10-CM

## 2024-11-16 DIAGNOSIS — R55 VASOVAGAL SYNCOPE: ICD-10-CM

## 2024-11-16 LAB
APPEARANCE UR: CLEAR
BILIRUB UR STRIP.AUTO-MCNC: NEGATIVE MG/DL
COLOR UR: NORMAL
GLUCOSE BLD MANUAL STRIP-MCNC: 96 MG/DL (ref 74–99)
GLUCOSE UR STRIP.AUTO-MCNC: NORMAL MG/DL
KETONES UR STRIP.AUTO-MCNC: NEGATIVE MG/DL
LEUKOCYTE ESTERASE UR QL STRIP.AUTO: NEGATIVE
NITRITE UR QL STRIP.AUTO: NEGATIVE
PH UR STRIP.AUTO: 6 [PH]
PROT UR STRIP.AUTO-MCNC: NEGATIVE MG/DL
RBC # UR STRIP.AUTO: NEGATIVE /UL
SP GR UR STRIP.AUTO: 1.02
UROBILINOGEN UR STRIP.AUTO-MCNC: NORMAL MG/DL

## 2024-11-16 PROCEDURE — 99285 EMERGENCY DEPT VISIT HI MDM: CPT

## 2024-11-16 PROCEDURE — 36415 COLL VENOUS BLD VENIPUNCTURE: CPT | Performed by: EMERGENCY MEDICINE

## 2024-11-16 PROCEDURE — 36415 COLL VENOUS BLD VENIPUNCTURE: CPT

## 2024-11-16 PROCEDURE — 83735 ASSAY OF MAGNESIUM: CPT | Performed by: EMERGENCY MEDICINE

## 2024-11-16 PROCEDURE — 99285 EMERGENCY DEPT VISIT HI MDM: CPT | Mod: 25

## 2024-11-16 PROCEDURE — 84484 ASSAY OF TROPONIN QUANT: CPT

## 2024-11-16 PROCEDURE — 87661 TRICHOMONAS VAGINALIS AMPLIF: CPT

## 2024-11-16 PROCEDURE — 85025 COMPLETE CBC W/AUTO DIFF WBC: CPT

## 2024-11-16 PROCEDURE — 87491 CHLMYD TRACH DNA AMP PROBE: CPT

## 2024-11-16 PROCEDURE — 84132 ASSAY OF SERUM POTASSIUM: CPT | Performed by: EMERGENCY MEDICINE

## 2024-11-16 PROCEDURE — 82947 ASSAY GLUCOSE BLOOD QUANT: CPT

## 2024-11-16 PROCEDURE — 84132 ASSAY OF SERUM POTASSIUM: CPT

## 2024-11-16 PROCEDURE — 2500000004 HC RX 250 GENERAL PHARMACY W/ HCPCS (ALT 636 FOR OP/ED)

## 2024-11-16 PROCEDURE — 93308 TTE F-UP OR LMTD: CPT | Performed by: EMERGENCY MEDICINE

## 2024-11-16 PROCEDURE — 93010 ELECTROCARDIOGRAM REPORT: CPT

## 2024-11-16 PROCEDURE — 81003 URINALYSIS AUTO W/O SCOPE: CPT

## 2024-11-16 PROCEDURE — 83690 ASSAY OF LIPASE: CPT

## 2024-11-16 PROCEDURE — 83605 ASSAY OF LACTIC ACID: CPT

## 2024-11-16 ASSESSMENT — PAIN - FUNCTIONAL ASSESSMENT: PAIN_FUNCTIONAL_ASSESSMENT: 0-10

## 2024-11-17 ENCOUNTER — APPOINTMENT (OUTPATIENT)
Dept: RADIOLOGY | Facility: HOSPITAL | Age: 24
End: 2024-11-17

## 2024-11-17 ENCOUNTER — CLINICAL SUPPORT (OUTPATIENT)
Dept: EMERGENCY MEDICINE | Facility: HOSPITAL | Age: 24
End: 2024-11-17

## 2024-11-17 VITALS
SYSTOLIC BLOOD PRESSURE: 116 MMHG | RESPIRATION RATE: 18 BRPM | OXYGEN SATURATION: 97 % | BODY MASS INDEX: 25.61 KG/M2 | TEMPERATURE: 97.9 F | DIASTOLIC BLOOD PRESSURE: 73 MMHG | HEART RATE: 89 BPM | HEIGHT: 64 IN | WEIGHT: 150 LBS

## 2024-11-17 LAB
ALBUMIN SERPL BCP-MCNC: 5 G/DL (ref 3.4–5)
ALP SERPL-CCNC: 49 U/L (ref 33–120)
ALT SERPL W P-5'-P-CCNC: 30 U/L (ref 10–52)
ANION GAP BLDV CALCULATED.4IONS-SCNC: 11 MMOL/L (ref 10–25)
ANION GAP SERPL CALC-SCNC: 17 MMOL/L (ref 10–20)
AST SERPL W P-5'-P-CCNC: 44 U/L (ref 9–39)
BASE EXCESS BLDV CALC-SCNC: 0.1 MMOL/L (ref -2–3)
BASOPHILS # BLD AUTO: 0.05 X10*3/UL (ref 0–0.1)
BASOPHILS NFR BLD AUTO: 0.6 %
BILIRUB SERPL-MCNC: 0.7 MG/DL (ref 0–1.2)
BODY TEMPERATURE: 37 DEGREES CELSIUS
BUN SERPL-MCNC: 15 MG/DL (ref 6–23)
C TRACH RRNA SPEC QL NAA+PROBE: NEGATIVE
CA-I BLDV-SCNC: 1.15 MMOL/L (ref 1.1–1.33)
CALCIUM SERPL-MCNC: 9.1 MG/DL (ref 8.6–10.6)
CARDIAC TROPONIN I PNL SERPL HS: <3 NG/L (ref 0–53)
CHLORIDE BLDV-SCNC: 102 MMOL/L (ref 98–107)
CHLORIDE SERPL-SCNC: 101 MMOL/L (ref 98–107)
CO2 SERPL-SCNC: 22 MMOL/L (ref 21–32)
CREAT SERPL-MCNC: 1.03 MG/DL (ref 0.5–1.3)
EGFRCR SERPLBLD CKD-EPI 2021: >90 ML/MIN/1.73M*2
EOSINOPHIL # BLD AUTO: 0.38 X10*3/UL (ref 0–0.7)
EOSINOPHIL NFR BLD AUTO: 4.7 %
ERYTHROCYTE [DISTWIDTH] IN BLOOD BY AUTOMATED COUNT: 13.4 % (ref 11.5–14.5)
GLUCOSE BLDV-MCNC: 98 MG/DL (ref 74–99)
GLUCOSE SERPL-MCNC: 106 MG/DL (ref 74–99)
HCO3 BLDV-SCNC: 26 MMOL/L (ref 22–26)
HCT VFR BLD AUTO: 43.2 % (ref 41–52)
HCT VFR BLD EST: 43 % (ref 41–52)
HERPES SIMPLEX VIRUS 1 IGG: 0.7 INDEX
HERPES SIMPLEX VIRUS 2 IGG: <0.2 INDEX
HGB BLD-MCNC: 14.4 G/DL (ref 13.5–17.5)
HGB BLDV-MCNC: 14.3 G/DL (ref 13.5–17.5)
HOLD SPECIMEN: NORMAL
IMM GRANULOCYTES # BLD AUTO: 0.01 X10*3/UL (ref 0–0.7)
IMM GRANULOCYTES NFR BLD AUTO: 0.1 % (ref 0–0.9)
INHALED O2 CONCENTRATION: 21 %
LACTATE BLDV-SCNC: 1.7 MMOL/L (ref 0.4–2)
LACTATE SERPL-SCNC: 2.2 MMOL/L (ref 0.4–2)
LIPASE SERPL-CCNC: 24 U/L (ref 9–82)
LYMPHOCYTES # BLD AUTO: 3.34 X10*3/UL (ref 1.2–4.8)
LYMPHOCYTES NFR BLD AUTO: 41 %
MAGNESIUM SERPL-MCNC: 2.39 MG/DL (ref 1.6–2.4)
MCH RBC QN AUTO: 25.3 PG (ref 26–34)
MCHC RBC AUTO-ENTMCNC: 33.3 G/DL (ref 32–36)
MCV RBC AUTO: 76 FL (ref 80–100)
MONOCYTES # BLD AUTO: 0.58 X10*3/UL (ref 0.1–1)
MONOCYTES NFR BLD AUTO: 7.1 %
N GONORRHOEA DNA SPEC QL PROBE+SIG AMP: NEGATIVE
NEUTROPHILS # BLD AUTO: 3.78 X10*3/UL (ref 1.2–7.7)
NEUTROPHILS NFR BLD AUTO: 46.5 %
NRBC BLD-RTO: 0 /100 WBCS (ref 0–0)
OXYHGB MFR BLDV: 53.5 % (ref 45–75)
PCO2 BLDV: 46 MM HG (ref 41–51)
PH BLDV: 7.36 PH (ref 7.33–7.43)
PLATELET # BLD AUTO: 211 X10*3/UL (ref 150–450)
PO2 BLDV: 34 MM HG (ref 35–45)
POTASSIUM BLDV-SCNC: 3.8 MMOL/L (ref 3.5–5.3)
POTASSIUM SERPL-SCNC: 4.9 MMOL/L (ref 3.5–5.3)
PROT SERPL-MCNC: 8.3 G/DL (ref 6.4–8.2)
RBC # BLD AUTO: 5.7 X10*6/UL (ref 4.5–5.9)
SAO2 % BLDV: 54 % (ref 45–75)
SODIUM BLDV-SCNC: 135 MMOL/L (ref 136–145)
SODIUM SERPL-SCNC: 135 MMOL/L (ref 136–145)
T VAGINALIS RRNA SPEC QL NAA+PROBE: NEGATIVE
WBC # BLD AUTO: 8.1 X10*3/UL (ref 4.4–11.3)

## 2024-11-17 PROCEDURE — 36415 COLL VENOUS BLD VENIPUNCTURE: CPT

## 2024-11-17 PROCEDURE — 93005 ELECTROCARDIOGRAM TRACING: CPT

## 2024-11-17 PROCEDURE — 74177 CT ABD & PELVIS W/CONTRAST: CPT

## 2024-11-17 PROCEDURE — 86695 HERPES SIMPLEX TYPE 1 TEST: CPT

## 2024-11-17 PROCEDURE — 96360 HYDRATION IV INFUSION INIT: CPT | Mod: 59

## 2024-11-17 PROCEDURE — 71046 X-RAY EXAM CHEST 2 VIEWS: CPT | Mod: FOREIGN READ | Performed by: RADIOLOGY

## 2024-11-17 PROCEDURE — 74177 CT ABD & PELVIS W/CONTRAST: CPT | Mod: FOREIGN READ | Performed by: RADIOLOGY

## 2024-11-17 PROCEDURE — 70450 CT HEAD/BRAIN W/O DYE: CPT

## 2024-11-17 PROCEDURE — 70450 CT HEAD/BRAIN W/O DYE: CPT | Performed by: RADIOLOGY

## 2024-11-17 PROCEDURE — 2550000001 HC RX 255 CONTRASTS: Performed by: EMERGENCY MEDICINE

## 2024-11-17 PROCEDURE — 72125 CT NECK SPINE W/O DYE: CPT | Performed by: RADIOLOGY

## 2024-11-17 PROCEDURE — 71046 X-RAY EXAM CHEST 2 VIEWS: CPT

## 2024-11-17 PROCEDURE — 96361 HYDRATE IV INFUSION ADD-ON: CPT

## 2024-11-17 PROCEDURE — 72125 CT NECK SPINE W/O DYE: CPT

## 2024-11-17 NOTE — ED PROVIDER NOTES
"HPI   Chief Complaint   Patient presents with    Exposure to STD       HPI    Willian Glez is a 24-year-old male with history of asthma presented the ED with concern that he has herpes.  Patient states he has been having \"discomfort\" to the penis since September.  Patient states the tip of the penis has been sore for the past 5 days.  Patient states he has been having increased urinary frequency as well as September but denies hematuria and dysuria.  Patient states a few days ago he noticed bumps on the penis.  Patient denies abnormal discharge in the penis and patient is to the penis.  Patient states he has been having shortness of breath with activity and productive cough for the past 2 months and this is not normal.  Patient denies chest pain, abdominal pain, nausea, vomiting, fevers, bodies, chills.    Patient History   Past Medical History:   Diagnosis Date    Bilateral intraabdominal testes     Bilateral cryptorchidism    Cough, unspecified 2016    Cough    Lower abdominal pain, unspecified     Groin pain    Personal history of (healed) traumatic fracture 2016    History of fracture of clavicle    Personal history of other diseases of the digestive system 2014    History of gastroesophageal reflux (GERD)     , unspecified weeks of gestation ([facility])     Prematurity    Testicular pain, unspecified 2016    Testicular pain    Tinnitus, bilateral 2020    Tinnitus of both ears    Unspecified fracture of unspecified metacarpal bone, initial encounter for closed fracture 2016    Fracture, metacarpal     Past Surgical History:   Procedure Laterality Date    OTHER SURGICAL HISTORY  2014    Surgery Testis    OTHER SURGICAL HISTORY  2014    Surgery Testis Orchiopexy, Inguinal Approach     No family history on file.  Social History     Tobacco Use    Smoking status: Never    Smokeless tobacco: Never   Vaping Use    Vaping status: Never Used   Substance Use " Topics    Alcohol use: Never    Drug use: Never       Physical Exam   ED Triage Vitals [11/16/24 2229]   Temperature Heart Rate Respirations BP   36.6 °C (97.9 °F) 98 18 127/77      Pulse Ox Temp src Heart Rate Source Patient Position   96 % -- -- --      BP Location FiO2 (%)     -- --       Physical Exam  Vitals and nursing note reviewed. Exam conducted with a chaperone present.   Constitutional:       Appearance: Normal appearance.   HENT:      Head: Normocephalic and atraumatic.      Mouth/Throat:      Mouth: Mucous membranes are moist.      Pharynx: Oropharynx is clear. Uvula midline. No oropharyngeal exudate or posterior oropharyngeal erythema.   Eyes:      Extraocular Movements: Extraocular movements intact.      Conjunctiva/sclera: Conjunctivae normal.      Pupils: Pupils are equal, round, and reactive to light.   Cardiovascular:      Rate and Rhythm: Normal rate and regular rhythm.      Heart sounds: Normal heart sounds. No murmur heard.     No gallop.   Pulmonary:      Effort: Pulmonary effort is normal. No respiratory distress.      Breath sounds: Normal breath sounds. No stridor. No wheezing, rhonchi or rales.   Abdominal:      General: Abdomen is flat. Bowel sounds are normal. There is no distension.      Palpations: Abdomen is soft. There is no mass.      Tenderness: There is abdominal tenderness in the right lower quadrant and left lower quadrant. There is guarding. There is no rebound.      Hernia: No hernia is present.   Genitourinary:     Comments: Please see photo above rash to the penis in media section of patient's chart.  Mild tenderness to the rash.  No swelling or erythema to the penis.  Musculoskeletal:      Right lower leg: No edema.      Left lower leg: No edema.   Skin:     General: Skin is warm and dry.   Neurological:      General: No focal deficit present.      Mental Status: He is alert and oriented to person, place, and time.      Cranial Nerves: Cranial nerves 2-12 are intact. No  "dysarthria or facial asymmetry.      Motor: Motor function is intact.      Coordination: Coordination is intact.      Gait: Gait is intact.   Psychiatric:         Mood and Affect: Mood normal.         Behavior: Behavior normal.           ED Course & MDM   ED Course as of 11/17/24 0532   Sun Nov 17, 2024   0153 0123 EKG per my interpretation reveals normal sinus rhythm, sinus rhythm, rate 88, normal axis, normal intervals, slight J-point elevation diffusely with early repolarization pattern, no reciprocal changes. [LM]      ED Course User Index  [LM] Polina Hubbard MD         Diagnoses as of 11/17/24 0532   Possible exposure to STD   Vasovagal syncope   Folliculitis                 No data recorded     Leatha Coma Scale Score: 15 (11/17/24 0026 : Justyn Colon RN)                           Medical Decision Making  This is a 24-year-old male who presented to the ED with concern that it is herpes.  Patient states he has been having \"discomfort\" to the penis since September.  Patient states the tip of the penis has also been sore for the past 5 days.  Patient states he is been having increased urinary frequency since September.  UA was obtained to rule out UTI.  Per chart review the patient has been seen multiple times for similar symptoms.   exam was performed with attending physician present.  Photo of the rash to the penis can be found in media section of patient's chart.  The rash does not appear to be herpes and is most likely folliculitis.  Discussed with patient that testing for herpes is not obtained unless there are active lesions and since there are no active herpes lesions at this time testing for HSV will not be obtained today.  However patient stated he looked on the  website and found that there is a blood test for HSV.  Had a lengthy and thorough discussion with the patient that the blood test for HSV will give results as to if he has ever been exposed to herpes however it would not tell him if " there is an active herpes infection.  Discussed with patient if the blood test for HSV does come back positive then he will still not need treatment for HSV since he does not have active lesions at this time.  Patient expressed the understanding of the lab and states he still wants to be tested for HSV with the blood test.  Therefore HSV IgG test obtained today.  Urine test for chlamydia, gonorrhea, trichomonas were also obtained today.  On abdominal exam patient has tenderness in the right and left lower quadrant with guarding.  CT abdomen pelvis was obtained today for further evaluation of abdominal tenderness with concern for appendicitis, small bowel obstruction, bowel perforation, diverticulitis, diverticulosis.  CBC was obtained today for evaluation of anemia and leukocytosis.  CMP also obtained today for evaluation of kidney function and electrolytes.  CBC and CMP are overall unremarkable.  Lipase obtained to rule out pancreatitis and lipase is within normal limits.  UA is negative for UTI.  CT shows thickened rectum which may be infectious or inflammatory.  Patient denies rectal pain therefore treatment not initiated.  Chest x-ray was obtained for further evaluation of shortness of breath and productive cough.  X-ray shows no acute cardiopulmonary pathology.    While patient was getting an IV in the ED he had a syncopal episode.  Patient states he did not fall from standing and he lowered himself to the ground.  Patient states he did not hit his head during the syncopal episode.  However since the patient was found on the ground CT head and cervical spine were obtained to rule out intracranial abnormalities and cervical spine fracture or dislocation.  CT showed no acute intracranial hemorrhage or mass effect as well as no acute fracture or traumatic malalignment of the cervical spine.  Patient woke up from the syncopal episode and was able to state the events that occurred prior and identify himself  correctly.  Blood pressure was 91/76 and the patient was given 1 L LR bolus.  Point-of-care care glucose was 96.  VBG was obtained for lactate to determine if the patient had a seizure.  Lactate on VBG was 1.7 which is improved from lactate of 2.2 on lab draw that was obtained prior.  Most likely cause of the patient's syncopal episode is from vasovagal syncope.  EKG and troponin were obtained to rule out ACS causing the syncopal episode.  Troponin is within normal limits and EKG interpretation can be found in ED course.    Disposition: Discharge home  Patient advised to follow-up with his primary care provider and urology for further evaluation of the symptoms.  Referral and contact information for the family medicine clinic and urology clinic has been included in discharge paperwork.  Strict return precautions given.  Plan was discussed with the patient and the patient understands and agrees.  Patient was discharged in stable condition.    Patient was discussed and staffed with Dr. Hubbard    Procedure  Procedures     Stefano Al PA-C  11/17/24 0532

## 2024-11-17 NOTE — ED PROCEDURE NOTE
Procedure    Performed by: Polina Hubbard MD  Authorized by: Polina Hubbard MD  Cardiac Indications: syncope                Procedure: Cardiac Ultrasound    Findings:    The pericardial space was visualized and was NEGATIVE for a significant pericardial effusion.  Activity: Ventricular contractions were visualized.  LV: LV systolic function was NORMAL.  RV: RV size was NORMAL.    Impression:  Cardiac: The focused cardiac ultrasound exam was NORMAL.    Comments: IVC is collapsed               Polina Hubbard MD  11/17/24 0116

## 2024-11-17 NOTE — DISCHARGE INSTRUCTIONS
Please follow-up with urology and primary care doctor for further evaluation management of her symptoms.  Referral and contact information for the family medicine clinic in the urology clinic has been included in discharge paperwork.  Return to the emergency department if your symptoms persist or worsen.

## 2024-11-17 NOTE — ED TRIAGE NOTES
Pt to ED requesting an STD check, pt is stating he has bumps and would like a test. Pt also c/o cold sore on lip.

## 2024-11-25 ENCOUNTER — CLINICAL SUPPORT (OUTPATIENT)
Dept: EMERGENCY MEDICINE | Facility: HOSPITAL | Age: 24
End: 2024-11-25

## 2024-11-25 ENCOUNTER — APPOINTMENT (OUTPATIENT)
Dept: RADIOLOGY | Facility: HOSPITAL | Age: 24
End: 2024-11-25

## 2024-11-25 ENCOUNTER — HOSPITAL ENCOUNTER (EMERGENCY)
Facility: HOSPITAL | Age: 24
Discharge: LEFT BEFORE DISCHARGE | End: 2024-11-25
Attending: EMERGENCY MEDICINE

## 2024-11-25 VITALS
WEIGHT: 150 LBS | TEMPERATURE: 97.3 F | OXYGEN SATURATION: 97 % | HEIGHT: 64 IN | SYSTOLIC BLOOD PRESSURE: 119 MMHG | DIASTOLIC BLOOD PRESSURE: 60 MMHG | BODY MASS INDEX: 25.61 KG/M2 | RESPIRATION RATE: 16 BRPM | HEART RATE: 70 BPM

## 2024-11-25 DIAGNOSIS — R55 SYNCOPE, UNSPECIFIED SYNCOPE TYPE: ICD-10-CM

## 2024-11-25 DIAGNOSIS — R07.9 CHEST PAIN, UNSPECIFIED TYPE: Primary | ICD-10-CM

## 2024-11-25 LAB
ALBUMIN SERPL BCP-MCNC: 5 G/DL (ref 3.4–5)
ALP SERPL-CCNC: 54 U/L (ref 33–120)
ALT SERPL W P-5'-P-CCNC: 19 U/L (ref 10–52)
ANION GAP SERPL CALC-SCNC: 17 MMOL/L (ref 10–20)
AST SERPL W P-5'-P-CCNC: 27 U/L (ref 9–39)
ATRIAL RATE: 78 BPM
BILIRUB SERPL-MCNC: 0.7 MG/DL (ref 0–1.2)
BUN SERPL-MCNC: 19 MG/DL (ref 6–23)
CALCIUM SERPL-MCNC: 10.4 MG/DL (ref 8.6–10.6)
CARDIAC TROPONIN I PNL SERPL HS: <3 NG/L (ref 0–53)
CHLORIDE SERPL-SCNC: 98 MMOL/L (ref 98–107)
CO2 SERPL-SCNC: 26 MMOL/L (ref 21–32)
CREAT SERPL-MCNC: 1.02 MG/DL (ref 0.5–1.3)
D DIMER PPP FEU-MCNC: 219 NG/ML FEU
EGFRCR SERPLBLD CKD-EPI 2021: >90 ML/MIN/1.73M*2
FLUAV RNA RESP QL NAA+PROBE: NOT DETECTED
FLUBV RNA RESP QL NAA+PROBE: NOT DETECTED
GLUCOSE SERPL-MCNC: 112 MG/DL (ref 74–99)
MAGNESIUM SERPL-MCNC: 2.23 MG/DL (ref 1.6–2.4)
P AXIS: 76 DEGREES
P OFFSET: 203 MS
P ONSET: 162 MS
POTASSIUM SERPL-SCNC: 5.2 MMOL/L (ref 3.5–5.3)
PR INTERVAL: 114 MS
PROT SERPL-MCNC: 7.8 G/DL (ref 6.4–8.2)
Q ONSET: 219 MS
QRS COUNT: 13 BEATS
QRS DURATION: 94 MS
QT INTERVAL: 362 MS
QTC CALCULATION(BAZETT): 412 MS
QTC FREDERICIA: 395 MS
R AXIS: 85 DEGREES
SARS-COV-2 RNA RESP QL NAA+PROBE: NOT DETECTED
SODIUM SERPL-SCNC: 136 MMOL/L (ref 136–145)
T AXIS: 56 DEGREES
T OFFSET: 400 MS
VENTRICULAR RATE: 78 BPM

## 2024-11-25 PROCEDURE — 71046 X-RAY EXAM CHEST 2 VIEWS: CPT

## 2024-11-25 PROCEDURE — 2500000002 HC RX 250 W HCPCS SELF ADMINISTERED DRUGS (ALT 637 FOR MEDICARE OP, ALT 636 FOR OP/ED)

## 2024-11-25 PROCEDURE — 71046 X-RAY EXAM CHEST 2 VIEWS: CPT | Mod: FOREIGN READ | Performed by: RADIOLOGY

## 2024-11-25 PROCEDURE — 93005 ELECTROCARDIOGRAM TRACING: CPT

## 2024-11-25 PROCEDURE — 87636 SARSCOV2 & INF A&B AMP PRB: CPT

## 2024-11-25 PROCEDURE — 83735 ASSAY OF MAGNESIUM: CPT

## 2024-11-25 PROCEDURE — 36415 COLL VENOUS BLD VENIPUNCTURE: CPT

## 2024-11-25 PROCEDURE — 2500000005 HC RX 250 GENERAL PHARMACY W/O HCPCS

## 2024-11-25 PROCEDURE — 2500000001 HC RX 250 WO HCPCS SELF ADMINISTERED DRUGS (ALT 637 FOR MEDICARE OP)

## 2024-11-25 PROCEDURE — 84484 ASSAY OF TROPONIN QUANT: CPT

## 2024-11-25 PROCEDURE — 2500000004 HC RX 250 GENERAL PHARMACY W/ HCPCS (ALT 636 FOR OP/ED)

## 2024-11-25 PROCEDURE — 76604 US EXAM CHEST: CPT

## 2024-11-25 PROCEDURE — 99283 EMERGENCY DEPT VISIT LOW MDM: CPT | Mod: 25

## 2024-11-25 PROCEDURE — 84075 ASSAY ALKALINE PHOSPHATASE: CPT

## 2024-11-25 PROCEDURE — 85379 FIBRIN DEGRADATION QUANT: CPT

## 2024-11-25 PROCEDURE — 96372 THER/PROPH/DIAG INJ SC/IM: CPT

## 2024-11-25 RX ORDER — DIPHENHYDRAMINE HCL 12.5MG/5ML
25 LIQUID (ML) ORAL ONCE
Status: COMPLETED | OUTPATIENT
Start: 2024-11-25 | End: 2024-11-25

## 2024-11-25 RX ORDER — ALUMINUM HYDROXIDE, MAGNESIUM HYDROXIDE, AND SIMETHICONE 1200; 120; 1200 MG/30ML; MG/30ML; MG/30ML
10 SUSPENSION ORAL ONCE
Status: COMPLETED | OUTPATIENT
Start: 2024-11-25 | End: 2024-11-25

## 2024-11-25 RX ORDER — LIDOCAINE HYDROCHLORIDE 20 MG/ML
1.25 SOLUTION OROPHARYNGEAL ONCE
Status: COMPLETED | OUTPATIENT
Start: 2024-11-25 | End: 2024-11-25

## 2024-11-25 RX ORDER — KETOROLAC TROMETHAMINE 15 MG/ML
15 INJECTION, SOLUTION INTRAMUSCULAR; INTRAVENOUS ONCE
Status: COMPLETED | OUTPATIENT
Start: 2024-11-25 | End: 2024-11-25

## 2024-11-25 ASSESSMENT — PAIN DESCRIPTION - PAIN TYPE: TYPE: ACUTE PAIN

## 2024-11-25 ASSESSMENT — PAIN SCALES - GENERAL: PAINLEVEL_OUTOF10: 8

## 2024-11-25 ASSESSMENT — PAIN DESCRIPTION - LOCATION: LOCATION: CHEST

## 2024-11-25 ASSESSMENT — PAIN - FUNCTIONAL ASSESSMENT: PAIN_FUNCTIONAL_ASSESSMENT: 0-10

## 2024-11-25 NOTE — ED TRIAGE NOTES
CP x 2 days seen today at [facility], left CC and check in our ED for a 2nd opinion. Pt state that he can;t take a deep breath without pain

## 2024-11-25 NOTE — PROGRESS NOTES
"Willian Glez is a 24 y.o. male on day 0 of admission.    Per TAMARA Rodriguez CNP, patient uninsured and stating his employers are telling him he cannot enroll in insurance, patient unsure of reasoning. Patient needing coverage to manage visits and medications.     [facility] Elida Cyr consulted. Per Elida, \"I screened him and he is over income guidelines for [place] Medicaid. I had him sign the [employer] application and gave him a phone number to call and sign up for insurance on his own since it's still open enrollment.\"    CNP updated via secure chat.       "

## 2024-11-25 NOTE — ED PROVIDER NOTES
History of Present Illness       History provided by: Patient  Limitations to History: None  External Records Reviewed with Brief Summary: Outpatient progress note from 11/11/24 which showed patient presented initially for dyspnea.  Again endorsed that has been ongoing for the last month or so.  Was recently his insurance at that point.  Chest x-ray at that time was reassuring.  Was discharged home.    HPI:  24-year-old male with history of asthma presents for multiple medical complaints.  Chief complaint seems to be that he has had chest pain worsening over the last 2 days been ongoing intermittently for the last couple of months.  Tight in nature.  Denies injury.  It worsens with cough and deep breathing.  Denies injury.  States that he also has had multiple syncopal episodes over the last couple of months, most recently while at work on Saturday.  States that he did fall and lose consciousness.  Denies hitting his head or losing consciousness though.  Was seen after that including clinic and they sent him home.  States that this triggered an asthma exacerbation he does not feel improved.  Denies any hemoptysis.  Denies history of PE.  No recent travel or other hospital admissions.  Endorses some dyspnea.  Denies chills or myalgia.  No trouble with peeing or pooping.  No rashes or swelling.  Denies using hormone therapy.               Physical Exam   Physical Exam  Vitals and nursing note reviewed.   Constitutional:       General: He is not in acute distress.     Appearance: He is well-developed.   HENT:      Head: Normocephalic and atraumatic.   Eyes:      Conjunctiva/sclera: Conjunctivae normal.   Cardiovascular:      Rate and Rhythm: Normal rate and regular rhythm.      Heart sounds: No murmur heard.  Pulmonary:      Effort: Pulmonary effort is normal. No respiratory distress.      Breath sounds: Normal breath sounds.      Comments: No adventitious lung sounds noted  Chest:      Comments: Sternal tenderness  on palpation.  No crepitus or deformity.  No paroxysmal chest wall movement  Abdominal:      Palpations: Abdomen is soft.      Tenderness: There is no abdominal tenderness.   Musculoskeletal:         General: No swelling.      Cervical back: Neck supple.      Comments: Ambulatory with steady gait.  No rashes or swelling.   Skin:     General: Skin is warm and dry.      Capillary Refill: Capillary refill takes less than 2 seconds.   Neurological:      Mental Status: He is alert.   Psychiatric:         Mood and Affect: Mood normal.          Triage vitals:  T 36.3 °C (97.3 °F)  HR 70  /60  RR 16  O2 97 % None (Room air)        Medical Decision Making & ED Course     ED Course & MDM       Medical Decision Making:  Medical Decision Making  Vital signs reviewed, unremarkable at this time.  Patient is well-appearing and in no apparent distress.  Speaks full sentences without difficulty.  Diagnostic testing performed.  Given BMX type treatment with Toradol also. CMP, magnesium, troponin, D-dimer all reassuring and within normal ranges unremarkable COVID and influenza not detected on PCR tests.  Chest x-ray shows no acute process.  Point-of-care ultrasound shows normal.  On reevaluation the patient endorsed feeling overall improved but still having some discomfort.  With his frequent episodes of syncope in the setting of chest pain and dyspnea today, we recommended admission to the CDU for TTE and possible event monitor to go home with.  Patient declined, stating that he was unsure if he had the money to stay right now and that he needs to figure out insurance first.  Advised that he could not be sure whether or not he had a worsening cardiomyopathy/heart failure, arrhythmia, etc., and risk of death, continued pain, and worsening injury.  States that he is aware of this and in agreement.  He was able to sign AMA form.  States that he will still go home but keep that in mind.  Advised to return with any new or  "worsening symptoms and to follow-up with cardiology and primary care soon as possible.  I also got him help with social work, who gave him info and getting insurance.  Patient states that he will call first thing tomorrow morning.  States that he is in agreement with this plan.  Discharged AGAINST MEDICAL ADVICE.       ----      Differential diagnoses considered include but are not limited to: Asthma exacerbation, musculoskeletal chest pain, ACS, PE     Social Determinants of Health which Significantly Impact Care: Financial difficulties The following actions were taken to address these social determinants: Patient offered evaluation by Social Work    Independent Result Review and Interpretation: Relevant laboratory and radiographic results were reviewed and independently interpreted by myself.  As necessary, they are commented on in the ED Course.    Chronic conditions affecting the patient's care: As documented above in MDM    The patient was discussed with the following consultants/services: None    Care Considerations: As documented above in MDM      Visit Vitals  /60 (BP Location: Left arm, Patient Position: Sitting)   Pulse 70   Temp 36.3 °C (97.3 °F) (Skin)   Resp 16   Ht 1.626 m (5' 4\")   Wt 68 kg (150 lb)   SpO2 97%   BMI 25.75 kg/m²   Smoking Status Never   BSA 1.75 m²        Labs Reviewed   CBC WITH AUTO DIFFERENTIAL   COMPREHENSIVE METABOLIC PANEL   MAGNESIUM   TROPONIN I, HIGH SENSITIVITY   D-DIMER, VTE EXCLUSION   INFLUENZA A AND B PCR   SARS-COV-2 PCR       XR chest 2 views    (Results Pending)       ED Course:  Diagnoses as of 11/25/24 1826   Chest pain, unspecified type   Syncope, unspecified syncope type     Disposition   The patient elected to leave the Emergency Department against medical advice. In my opinion, the patient has capacity to leave AMA. he is clinically sober, free from distracting injury, appears to have intact insight, judgment, and reason, and in my opinion has capacity to " make decisions. I explained to him that these symptoms may represent a serious underlying medical condition and he verbalized understanding of my concerns and understands the consequences of leaving without complete evaluation.    I had a discussion with the patient about his workup and results, and informed him what the next step in diagnosis and treatment would be, and he verbalized understanding. I explained the risks of leaving without further workup or treatment, which included reasonably foreseeable complications such as death, serious injury, and permanent disability. I also offered alternatives to departing AMA.    I have asked him to return as soon as possible to complete his evaluation, and also explained that he is welcome to return to the ED for further evaluation whenever he chooses. I have asked him to follow up with his primary doctor as soon as possible. All of his questions were answered and he was given oral discharge instructions.    Procedures   Procedures        AMANDA Bee-CNP  Emergency Medicine      CHRIS Bee  11/25/24 2665

## 2024-11-25 NOTE — ED PROCEDURE NOTE
Procedure    Performed by: Zeke Tavares MD  Authorized by: Natanael Barajas MD  Cardiac Indications: chest pain                Procedure: Cardiac Ultrasound    Findings:   Views: parasternal long, parasternal short, apical four and subxiphoid  The pericardial space was visualized and was NEGATIVE for a significant pericardial effusion.  Activity: Ventricular contractions were visualized.  LV: LV systolic function was NORMAL.  RV: RV size was NORMAL.    Impression:  Cardiac: The focused cardiac ultrasound exam was NORMAL.  Procedure: Thoracic Ultrasound    Findings:  R Lung Sliding: The RIGHT chest was evaluated and LUNG SLIDING was visualized.  L Lung Sliding: The LEFT chest was evaluated and LUNG SLIDING was visualized.  R Effusion: The RIGHT chest was evaluated and there was NO PLEURAL EFFUSION.  L Effusion: The LEFT chest was evaluated and there was NO PLEURAL EFFUSION.  A-lines: The RIGHT chest was evaluated and there were NO A-LINES visualized  B-lines: The RIGHT chest was evaluated and there were NO B-LINES visualized  R Consolidation: The RIGHT chest was evaluated and there was NO RIGHT CONSOLIDATION.  L Consolidation: The LEFT chest was evaluated and there was NO LEFT CONSOLIDATION.    Impression:  Thorax: The focused thoracic ultrasound exam was NORMAL.                   Zeke Tavares MD  Resident  11/25/24 1423

## 2024-12-04 ENCOUNTER — CLINICAL SUPPORT (OUTPATIENT)
Dept: EMERGENCY MEDICINE | Facility: HOSPITAL | Age: 24
End: 2024-12-04

## 2024-12-04 ENCOUNTER — HOSPITAL ENCOUNTER (OUTPATIENT)
Facility: HOSPITAL | Age: 24
Setting detail: OBSERVATION
Discharge: HOME | End: 2024-12-06
Attending: STUDENT IN AN ORGANIZED HEALTH CARE EDUCATION/TRAINING PROGRAM | Admitting: STUDENT IN AN ORGANIZED HEALTH CARE EDUCATION/TRAINING PROGRAM

## 2024-12-04 ENCOUNTER — APPOINTMENT (OUTPATIENT)
Dept: RADIOLOGY | Facility: HOSPITAL | Age: 24
End: 2024-12-04

## 2024-12-04 DIAGNOSIS — J45.901 ASTHMA EXACERBATION, MILD (HHS-HCC): ICD-10-CM

## 2024-12-04 DIAGNOSIS — R07.89 CHEST TIGHTNESS: ICD-10-CM

## 2024-12-04 DIAGNOSIS — J45.909 UNSPECIFIED ASTHMA, UNCOMPLICATED (HHS-HCC): ICD-10-CM

## 2024-12-04 DIAGNOSIS — J45.41 MODERATE PERSISTENT ASTHMA WITH EXACERBATION (HHS-HCC): Primary | ICD-10-CM

## 2024-12-04 LAB
ANION GAP BLDV CALCULATED.4IONS-SCNC: 10 MMOL/L (ref 10–25)
ANION GAP BLDV CALCULATED.4IONS-SCNC: 7 MMOL/L (ref 10–25)
BASE EXCESS BLDV CALC-SCNC: 2.6 MMOL/L (ref -2–3)
BASE EXCESS BLDV CALC-SCNC: 2.6 MMOL/L (ref -2–3)
BODY TEMPERATURE: 37 DEGREES CELSIUS
BODY TEMPERATURE: 37 DEGREES CELSIUS
CA-I BLDV-SCNC: 1.17 MMOL/L (ref 1.1–1.33)
CA-I BLDV-SCNC: 1.17 MMOL/L (ref 1.1–1.33)
CHLORIDE BLDV-SCNC: 101 MMOL/L (ref 98–107)
CHLORIDE BLDV-SCNC: 104 MMOL/L (ref 98–107)
GLUCOSE BLDV-MCNC: 93 MG/DL (ref 74–99)
GLUCOSE BLDV-MCNC: 99 MG/DL (ref 74–99)
HCO3 BLDV-SCNC: 27.2 MMOL/L (ref 22–26)
HCO3 BLDV-SCNC: 29.7 MMOL/L (ref 22–26)
HCT VFR BLD EST: 40 % (ref 41–52)
HCT VFR BLD EST: 41 % (ref 41–52)
HGB BLDV-MCNC: 13.2 G/DL (ref 13.5–17.5)
HGB BLDV-MCNC: 13.7 G/DL (ref 13.5–17.5)
LACTATE BLDV-SCNC: 1 MMOL/L (ref 0.4–2)
LACTATE BLDV-SCNC: 1.1 MMOL/L (ref 0.4–2)
OXYHGB MFR BLDV: 53.3 % (ref 45–75)
OXYHGB MFR BLDV: 87.4 % (ref 45–75)
PCO2 BLDV: 41 MM HG (ref 41–51)
PCO2 BLDV: 55 MM HG (ref 41–51)
PH BLDV: 7.34 PH (ref 7.33–7.43)
PH BLDV: 7.43 PH (ref 7.33–7.43)
PO2 BLDV: 34 MM HG (ref 35–45)
PO2 BLDV: 57 MM HG (ref 35–45)
POTASSIUM BLDV-SCNC: 4.2 MMOL/L (ref 3.5–5.3)
POTASSIUM BLDV-SCNC: 4.2 MMOL/L (ref 3.5–5.3)
SAO2 % BLDV: 54 % (ref 45–75)
SAO2 % BLDV: 89 % (ref 45–75)
SODIUM BLDV-SCNC: 134 MMOL/L (ref 136–145)
SODIUM BLDV-SCNC: 136 MMOL/L (ref 136–145)

## 2024-12-04 PROCEDURE — 99285 EMERGENCY DEPT VISIT HI MDM: CPT | Performed by: STUDENT IN AN ORGANIZED HEALTH CARE EDUCATION/TRAINING PROGRAM

## 2024-12-04 PROCEDURE — 2500000004 HC RX 250 GENERAL PHARMACY W/ HCPCS (ALT 636 FOR OP/ED)

## 2024-12-04 PROCEDURE — 84484 ASSAY OF TROPONIN QUANT: CPT

## 2024-12-04 PROCEDURE — 93005 ELECTROCARDIOGRAM TRACING: CPT

## 2024-12-04 PROCEDURE — 36415 COLL VENOUS BLD VENIPUNCTURE: CPT

## 2024-12-04 PROCEDURE — 83735 ASSAY OF MAGNESIUM: CPT

## 2024-12-04 PROCEDURE — 84132 ASSAY OF SERUM POTASSIUM: CPT

## 2024-12-04 PROCEDURE — 85025 COMPLETE CBC W/AUTO DIFF WBC: CPT

## 2024-12-04 PROCEDURE — 94640 AIRWAY INHALATION TREATMENT: CPT

## 2024-12-04 PROCEDURE — 2500000002 HC RX 250 W HCPCS SELF ADMINISTERED DRUGS (ALT 637 FOR MEDICARE OP, ALT 636 FOR OP/ED)

## 2024-12-04 PROCEDURE — 96375 TX/PRO/DX INJ NEW DRUG ADDON: CPT

## 2024-12-04 PROCEDURE — 93010 ELECTROCARDIOGRAM REPORT: CPT | Performed by: STUDENT IN AN ORGANIZED HEALTH CARE EDUCATION/TRAINING PROGRAM

## 2024-12-04 PROCEDURE — 96374 THER/PROPH/DIAG INJ IV PUSH: CPT

## 2024-12-04 PROCEDURE — 71045 X-RAY EXAM CHEST 1 VIEW: CPT | Mod: FOREIGN READ | Performed by: RADIOLOGY

## 2024-12-04 PROCEDURE — 71045 X-RAY EXAM CHEST 1 VIEW: CPT

## 2024-12-04 RX ORDER — ALBUTEROL SULFATE 0.83 MG/ML
SOLUTION RESPIRATORY (INHALATION)
Status: COMPLETED
Start: 2024-12-04 | End: 2024-12-05

## 2024-12-04 RX ORDER — MAGNESIUM SULFATE HEPTAHYDRATE 40 MG/ML
INJECTION, SOLUTION INTRAVENOUS
Status: COMPLETED
Start: 2024-12-04 | End: 2024-12-04

## 2024-12-04 RX ORDER — MAGNESIUM SULFATE HEPTAHYDRATE 40 MG/ML
2 INJECTION, SOLUTION INTRAVENOUS ONCE
Status: COMPLETED | OUTPATIENT
Start: 2024-12-04 | End: 2024-12-04

## 2024-12-04 RX ORDER — ALBUTEROL SULFATE 0.83 MG/ML
15 SOLUTION RESPIRATORY (INHALATION) ONCE
Status: COMPLETED | OUTPATIENT
Start: 2024-12-04 | End: 2024-12-05

## 2024-12-04 RX ORDER — DEXAMETHASONE SODIUM PHOSPHATE 10 MG/ML
10 INJECTION INTRAMUSCULAR; INTRAVENOUS ONCE
Status: COMPLETED | OUTPATIENT
Start: 2024-12-04 | End: 2024-12-04

## 2024-12-04 ASSESSMENT — PAIN SCALES - GENERAL: PAINLEVEL_OUTOF10: 9

## 2024-12-04 ASSESSMENT — PAIN - FUNCTIONAL ASSESSMENT: PAIN_FUNCTIONAL_ASSESSMENT: 0-10

## 2024-12-04 ASSESSMENT — PAIN DESCRIPTION - LOCATION: LOCATION: CHEST

## 2024-12-04 NOTE — LETTER
December 6, 2024     Patient: Willian Glez   YOB: 2000   Date of Visit: 12/4/2024       To Whom It May Concern:    Willian Glez was admitted on 12/4/2024 at [facility] for asthma exacerbation and discharged on 12/06/2024. Please excuse Mr. Dorsey for his absence from work.   Mr. Dorsey needs some time to recover and should be able to return to work on 12/09/2024 without restriction.    If you have any questions or concerns, please don't hesitate to call.         Sincerely,     __________________________________________________                                                   12/06/2024      Armando Velez DO   Hospitalist  [facility]

## 2024-12-05 PROBLEM — R07.89 ATYPICAL CHEST PAIN: Status: ACTIVE | Noted: 2024-12-05

## 2024-12-05 LAB
ALBUMIN SERPL BCP-MCNC: 4.6 G/DL (ref 3.4–5)
ALP SERPL-CCNC: 45 U/L (ref 33–120)
ALT SERPL W P-5'-P-CCNC: 25 U/L (ref 10–52)
AMPHETAMINES UR QL SCN: NORMAL
ANION GAP SERPL CALC-SCNC: 16 MMOL/L (ref 10–20)
APPEARANCE UR: CLEAR
AST SERPL W P-5'-P-CCNC: 17 U/L (ref 9–39)
ATRIAL RATE: 113 BPM
ATRIAL RATE: 95 BPM
BARBITURATES UR QL SCN: NORMAL
BASOPHILS # BLD AUTO: 0.05 X10*3/UL (ref 0–0.1)
BASOPHILS NFR BLD AUTO: 0.5 %
BENZODIAZ UR QL SCN: NORMAL
BILIRUB SERPL-MCNC: 0.3 MG/DL (ref 0–1.2)
BILIRUB UR STRIP.AUTO-MCNC: NEGATIVE MG/DL
BUN SERPL-MCNC: 17 MG/DL (ref 6–23)
BZE UR QL SCN: NORMAL
CALCIUM SERPL-MCNC: 9.2 MG/DL (ref 8.6–10.6)
CANNABINOIDS UR QL SCN: NORMAL
CARDIAC TROPONIN I PNL SERPL HS: <3 NG/L (ref 0–53)
CARDIAC TROPONIN I PNL SERPL HS: <3 NG/L (ref 0–53)
CHLORIDE SERPL-SCNC: 104 MMOL/L (ref 98–107)
CO2 SERPL-SCNC: 26 MMOL/L (ref 21–32)
COLOR UR: ABNORMAL
CREAT SERPL-MCNC: 1.12 MG/DL (ref 0.5–1.3)
EGFRCR SERPLBLD CKD-EPI 2021: >90 ML/MIN/1.73M*2
EOSINOPHIL # BLD AUTO: 0.42 X10*3/UL (ref 0–0.7)
EOSINOPHIL NFR BLD AUTO: 4.4 %
ERYTHROCYTE [DISTWIDTH] IN BLOOD BY AUTOMATED COUNT: 13.6 % (ref 11.5–14.5)
FENTANYL+NORFENTANYL UR QL SCN: NORMAL
GLUCOSE SERPL-MCNC: 89 MG/DL (ref 74–99)
GLUCOSE UR STRIP.AUTO-MCNC: ABNORMAL MG/DL
HCT VFR BLD AUTO: 40.3 % (ref 41–52)
HGB BLD-MCNC: 13.2 G/DL (ref 13.5–17.5)
HOLD SPECIMEN: NORMAL
IMM GRANULOCYTES # BLD AUTO: 0.04 X10*3/UL (ref 0–0.7)
IMM GRANULOCYTES NFR BLD AUTO: 0.4 % (ref 0–0.9)
KETONES UR STRIP.AUTO-MCNC: NEGATIVE MG/DL
LEUKOCYTE ESTERASE UR QL STRIP.AUTO: NEGATIVE
LYMPHOCYTES # BLD AUTO: 3.43 X10*3/UL (ref 1.2–4.8)
LYMPHOCYTES NFR BLD AUTO: 35.8 %
MAGNESIUM SERPL-MCNC: 1.97 MG/DL (ref 1.6–2.4)
MCH RBC QN AUTO: 25.2 PG (ref 26–34)
MCHC RBC AUTO-ENTMCNC: 32.8 G/DL (ref 32–36)
MCV RBC AUTO: 77 FL (ref 80–100)
METHADONE UR QL SCN: NORMAL
MONOCYTES # BLD AUTO: 1.09 X10*3/UL (ref 0.1–1)
MONOCYTES NFR BLD AUTO: 11.4 %
NEUTROPHILS # BLD AUTO: 4.56 X10*3/UL (ref 1.2–7.7)
NEUTROPHILS NFR BLD AUTO: 47.5 %
NITRITE UR QL STRIP.AUTO: NEGATIVE
NRBC BLD-RTO: 0 /100 WBCS (ref 0–0)
OPIATES UR QL SCN: NORMAL
OXYCODONE+OXYMORPHONE UR QL SCN: NORMAL
P AXIS: 67 DEGREES
P AXIS: 75 DEGREES
P OFFSET: 200 MS
P OFFSET: 207 MS
P ONSET: 156 MS
P ONSET: 159 MS
PCP UR QL SCN: NORMAL
PH UR STRIP.AUTO: 7 [PH]
PLATELET # BLD AUTO: 202 X10*3/UL (ref 150–450)
POTASSIUM SERPL-SCNC: 4.1 MMOL/L (ref 3.5–5.3)
PR INTERVAL: 124 MS
PR INTERVAL: 126 MS
PROT SERPL-MCNC: 7.3 G/DL (ref 6.4–8.2)
PROT UR STRIP.AUTO-MCNC: NEGATIVE MG/DL
Q ONSET: 219 MS
Q ONSET: 221 MS
QRS COUNT: 16 BEATS
QRS COUNT: 19 BEATS
QRS DURATION: 78 MS
QRS DURATION: 84 MS
QT INTERVAL: 320 MS
QT INTERVAL: 334 MS
QTC CALCULATION(BAZETT): 419 MS
QTC CALCULATION(BAZETT): 438 MS
QTC FREDERICIA: 389 MS
QTC FREDERICIA: 395 MS
R AXIS: 66 DEGREES
R AXIS: 79 DEGREES
RBC # BLD AUTO: 5.24 X10*6/UL (ref 4.5–5.9)
RBC # UR STRIP.AUTO: NEGATIVE /UL
SODIUM SERPL-SCNC: 142 MMOL/L (ref 136–145)
SP GR UR STRIP.AUTO: 1.01
T AXIS: 59 DEGREES
T AXIS: 60 DEGREES
T OFFSET: 381 MS
T OFFSET: 386 MS
UROBILINOGEN UR STRIP.AUTO-MCNC: NORMAL MG/DL
VENTRICULAR RATE: 113 BPM
VENTRICULAR RATE: 95 BPM
WBC # BLD AUTO: 9.6 X10*3/UL (ref 4.4–11.3)

## 2024-12-05 PROCEDURE — 2500000002 HC RX 250 W HCPCS SELF ADMINISTERED DRUGS (ALT 637 FOR MEDICARE OP, ALT 636 FOR OP/ED): Performed by: STUDENT IN AN ORGANIZED HEALTH CARE EDUCATION/TRAINING PROGRAM

## 2024-12-05 PROCEDURE — 2500000004 HC RX 250 GENERAL PHARMACY W/ HCPCS (ALT 636 FOR OP/ED): Performed by: STUDENT IN AN ORGANIZED HEALTH CARE EDUCATION/TRAINING PROGRAM

## 2024-12-05 PROCEDURE — G0378 HOSPITAL OBSERVATION PER HR: HCPCS

## 2024-12-05 PROCEDURE — 2500000001 HC RX 250 WO HCPCS SELF ADMINISTERED DRUGS (ALT 637 FOR MEDICARE OP): Performed by: STUDENT IN AN ORGANIZED HEALTH CARE EDUCATION/TRAINING PROGRAM

## 2024-12-05 PROCEDURE — 84484 ASSAY OF TROPONIN QUANT: CPT

## 2024-12-05 PROCEDURE — 81003 URINALYSIS AUTO W/O SCOPE: CPT

## 2024-12-05 PROCEDURE — 36415 COLL VENOUS BLD VENIPUNCTURE: CPT

## 2024-12-05 PROCEDURE — 99222 1ST HOSP IP/OBS MODERATE 55: CPT | Performed by: STUDENT IN AN ORGANIZED HEALTH CARE EDUCATION/TRAINING PROGRAM

## 2024-12-05 PROCEDURE — 80307 DRUG TEST PRSMV CHEM ANLYZR: CPT | Performed by: STUDENT IN AN ORGANIZED HEALTH CARE EDUCATION/TRAINING PROGRAM

## 2024-12-05 PROCEDURE — 94640 AIRWAY INHALATION TREATMENT: CPT

## 2024-12-05 RX ORDER — IPRATROPIUM BROMIDE AND ALBUTEROL SULFATE 2.5; .5 MG/3ML; MG/3ML
3 SOLUTION RESPIRATORY (INHALATION) EVERY 6 HOURS PRN
Status: DISCONTINUED | OUTPATIENT
Start: 2024-12-05 | End: 2024-12-06 | Stop reason: HOSPADM

## 2024-12-05 RX ORDER — FLUTICASONE FUROATE AND VILANTEROL 200; 25 UG/1; UG/1
1 POWDER RESPIRATORY (INHALATION)
Status: DISCONTINUED | OUTPATIENT
Start: 2024-12-05 | End: 2024-12-06 | Stop reason: HOSPADM

## 2024-12-05 RX ORDER — ACETAMINOPHEN 325 MG/1
650 TABLET ORAL EVERY 6 HOURS PRN
Status: DISCONTINUED | OUTPATIENT
Start: 2024-12-05 | End: 2024-12-06 | Stop reason: HOSPADM

## 2024-12-05 RX ORDER — PREDNISONE 20 MG/1
40 TABLET ORAL DAILY
Status: DISCONTINUED | OUTPATIENT
Start: 2024-12-05 | End: 2024-12-06 | Stop reason: HOSPADM

## 2024-12-05 RX ORDER — IPRATROPIUM BROMIDE AND ALBUTEROL SULFATE 2.5; .5 MG/3ML; MG/3ML
3 SOLUTION RESPIRATORY (INHALATION) ONCE
Status: COMPLETED | OUTPATIENT
Start: 2024-12-05 | End: 2024-12-05

## 2024-12-05 RX ORDER — FAMOTIDINE 20 MG/1
10 TABLET, FILM COATED ORAL DAILY
Status: DISCONTINUED | OUTPATIENT
Start: 2024-12-05 | End: 2024-12-06 | Stop reason: HOSPADM

## 2024-12-05 RX ORDER — BENZONATATE 100 MG/1
200 CAPSULE ORAL 3 TIMES DAILY PRN
Status: DISCONTINUED | OUTPATIENT
Start: 2024-12-05 | End: 2024-12-06 | Stop reason: HOSPADM

## 2024-12-05 RX ORDER — PREDNISONE 20 MG/1
40 TABLET ORAL DAILY
Status: DISCONTINUED | OUTPATIENT
Start: 2024-12-05 | End: 2024-12-05

## 2024-12-05 RX ORDER — CALCIUM CARBONATE 200(500)MG
1000 TABLET,CHEWABLE ORAL 2 TIMES DAILY PRN
Status: DISCONTINUED | OUTPATIENT
Start: 2024-12-05 | End: 2024-12-06 | Stop reason: HOSPADM

## 2024-12-05 RX ORDER — POLYETHYLENE GLYCOL 3350 17 G/17G
17 POWDER, FOR SOLUTION ORAL DAILY PRN
Status: DISCONTINUED | OUTPATIENT
Start: 2024-12-05 | End: 2024-12-06 | Stop reason: HOSPADM

## 2024-12-05 RX ORDER — GUAIFENESIN 600 MG/1
1200 TABLET, EXTENDED RELEASE ORAL 2 TIMES DAILY PRN
Status: DISCONTINUED | OUTPATIENT
Start: 2024-12-05 | End: 2024-12-06 | Stop reason: HOSPADM

## 2024-12-05 RX ORDER — MONTELUKAST SODIUM 4 MG/1
4 TABLET, CHEWABLE ORAL NIGHTLY
COMMUNITY
End: 2024-12-06 | Stop reason: HOSPADM

## 2024-12-05 RX ORDER — MONTELUKAST SODIUM 10 MG/1
10 TABLET ORAL NIGHTLY
Status: DISCONTINUED | OUTPATIENT
Start: 2024-12-05 | End: 2024-12-06 | Stop reason: HOSPADM

## 2024-12-05 RX ORDER — IBUPROFEN 200 MG
400 TABLET ORAL EVERY 6 HOURS PRN
COMMUNITY

## 2024-12-05 RX ORDER — CETIRIZINE HYDROCHLORIDE 10 MG/1
10 TABLET ORAL DAILY
Status: DISCONTINUED | OUTPATIENT
Start: 2024-12-05 | End: 2024-12-06 | Stop reason: HOSPADM

## 2024-12-05 ASSESSMENT — LIFESTYLE VARIABLES
TOTAL SCORE: 0
HAVE YOU EVER FELT YOU SHOULD CUT DOWN ON YOUR DRINKING: NO
HAVE PEOPLE ANNOYED YOU BY CRITICIZING YOUR DRINKING: NO
EVER HAD A DRINK FIRST THING IN THE MORNING TO STEADY YOUR NERVES TO GET RID OF A HANGOVER: NO
EVER FELT BAD OR GUILTY ABOUT YOUR DRINKING: NO

## 2024-12-05 ASSESSMENT — PAIN SCALES - GENERAL: PAINLEVEL_OUTOF10: 0 - NO PAIN

## 2024-12-05 NOTE — ED TRIAGE NOTES
Patient presents to the ED for SOB/ asthma from home. Patient took 7 treatments at home. EMS gave 125 solumedrol IM, 3 duonebs

## 2024-12-05 NOTE — ED PROVIDER NOTES
Emergency Department Provider Note        History of Present Illness     History provided by: Patient and Family Member (Uncle)   Limitations to History: None  External Records Reviewed with Brief Summary:  Prior ED note on 11/25/2024 where patient presented for chest pain and eventually left AMA after being offered admission to CDU for TTE, noting persistent chest pain while he was here in the emergency department.    HPI:  Willian Glez is a 24 y.o. male with prior history of migraines, asthma, and eczema presenting to the emergency department with asthma exacerbation.  Patient arrived as a critical from EMS received Solu-Medrol IM, in addition to 3 DuoNebs.  He arrived on supplementary oxygen saturating 100%, with improved work of breathing.  Noted that he has had multiple asthma exacerbations over the last few days, noting that he used his asthma inhaler up to 20 times today with no improvement of his symptoms. He declines being admitted for asthma in the past, and declines any prior history of intubation.  Does admit that he also has chest pain at this time, but denies any fevers, abdominal pain, nausea, or any vomiting.    Physical Exam   Triage vitals:  T 37.2 °C (99 °F)  HR 85  /81  RR (!) 25  O2 100 % Supplemental oxygen    GEN:  A&Ox3, no acute distress, appears comfortable. Conversational and appropriate.    HEENT: Normocephalic, atraumatic. Conjunctiva pink with no redness or exudates. Hearing grossly intact. Moist mucous membranes.  CARDIO: Normal rate and regular rhythm. Normal S1, S2  without murmurs, rubs, or gallops.   PULM: Poor breath sounds, minimal wheeze, No rales, or rhonchi.  No accessory muscle use or stridor.  GI: Soft, non-tender, non-distended. No rebound tenderness or guarding.   SKIN: Warm and dry, no rashes, lesions, petechiae, or purpura.  MSK: ROM intact in all 4 extremities without contractures or pain. No peripheral edema, contusions, or wounds.    NEURO: No focal  findings identified. No confusion or gross mental status changes.  PSYCH: Appropriate mood and behavior, converses and responds appropriately during exam.     Medical Decision Making & ED Course   Medical Decision Makin y.o. male ith prior history of migraines, asthma, and eczema presenting to the emergency department with asthma exacerbation.  Was given DuoNebs, albuterol, steroids, magnesium with initial improvement. Initial VBG with notable hypercarbia likely in the setting of multiple day asthma exacerbation, repeat evaluation after treatment did demonstrate improvement of his hypercarbia. However on repeat evaluation noting worsening of his symptoms with poor air movement throughout, therefore given an additional dose of albuterol.  Chest x-ray without any acute findings suggestive of pneumonia.  Overall, I believe patient's asthma is poorly controlled at home, currently only taking montelukast and albuterol, and notes that he has a pulmonology appointment scheduled for 2024.  States that he is currently still smoking a nicotine vape, but often is around other people who smoke tobacco exposing to secondhand smoke, which is likely worsening his asthma.  He did recently moved in with his uncle who also has a cat.  Given his multiple asthma exacerbations which are poorly controlled with at home medications, I offered patient admission.  He was agreeable to admission for further management.  Patient also noted repeat chest tightness with his asthma exacerbations, EKG without any acute changes from prior, however still with mild ST elevation in leads II, III, and aVF with depression in aVR, negative troponins x2.  Was offered CDU admission for TTE and cardiac workup on 2025, and had initially declined.  Given that he is agreeable to staying today, I do believe patient would benefit from this during his admission.  He was agreeable to this as well.  Discussed with admission coordinators and was  accepted for further management.  ----      Differential diagnoses considered include but are not limited to: Asthma exacerbation, pneumonia, ACS     Social Determinants of Health which Significantly Impact Care: None identified     EKG Independent Interpretation:  EKG noting normal sinus rhythm with minimal ST elevation in leads II, III and aVF, with depression in aVR, potentially concerning for pericarditis.  Normal intervals, normal axis.  Similar to prior EKG performed on 11/25/2024. Repeat EKG today unchanged from previous.     Independent Result Review and Interpretation: Relevant laboratory and radiographic results were reviewed and independently interpreted by myself.  As necessary, they are commented on in the ED Course.    Chronic conditions affecting the patient's care: As documented above in [facility]    The patient was discussed with the following consultants/services: None    Care Considerations: As documented above in [facility]    ED Course:  ED Course as of 12/05/24 0351   Thu Dec 05, 2024   0001 Emergency Medicine Attending Attestation:     My assessment reveals a 24-year-old male with history of asthma presenting to the emergency department with shortness of breath.  He has taken his albuterol inhaler numerous times today without any relief of symptoms.  EMS received 125 mg of IM Solu-Medrol, 3 DuoNebs.  Reports feeling slightly improved on arrival to the ED.  Exam is fairly reassuring on arrival to the ED as he is not working hard to breathe, has fairly clear aeration bilaterally, is able to speak in complete sentences and is not hypoxic.  Will  mag, additional albuterol.  Will reassess.  Will obtain chest x-ray, labs.  Anticipate potential for admission. [SH]   0200 Initially with significant improvement after albuterol continuous x 1 hour.  Lab work and chest x-ray not suggestive of infection.  [AD]   0306 On repeat evaluation, noting  worsening of wheezing and chest tightness.  On auscultation with  worsening aeration, given additional dose of albuterol.  Discussed admission for further management of his asthma, which she was agreeable to. [AD]      ED Course User Index  [AD] Omar Starks DO  [SH] Yariel Murcia MD         Diagnoses as of 12/05/24 0351   Moderate persistent asthma with exacerbation ([facility])   Chest tightness     Disposition   As a result of their workup, the patient will require admission to the hospital.  The patient was informed of his diagnosis.  The patient was given the opportunity to ask questions and I answered them. The patient agreed to be admitted to the hospital.    Procedures   Procedures    Patient seen and discussed with ED attending physician.    Omar Starks DO  Emergency Medicine       Omar Starks DO  Resident  12/05/24 0351

## 2024-12-05 NOTE — ASSESSMENT & PLAN NOTE
#atypical chest ashleigh coronado musculoskeletal in context of acute pneumonitis w/ cough 2/2 vaping substance and hx likley suboptimal tx asthma. Unlikely acute ACS given ER workup and this exam/hx, unlikely infectious etiology. Rapidly Improved on this  exam compared to ER report likey w/initail ER interventions, some cough/subjective chest tightness/pleuritic irritation. No hypoxia. Added prednisone 40 mg po x 5 days, ex[ect further improvement w/addition of Breo, spiriva, optimize asthma mgt w/H2 blocker daily, mucinex prn, incentive spirometry and peak flow  #second hand tobacco smoke exposure, vaping (uncertain substance) discussed risk and limitations of maintenance  and rescue asthma regimens, encouraged avoidance of vapin/inhalants/smoke exposure   #Tachycardia in setting of excessive use of albuterol, improved on admit   #at risk for medication noncompliance, suggested to pt Meds to Beds before DC and follow up     Observation, anticipate < 2 midnights

## 2024-12-05 NOTE — H&P
"History Of Present Illness  Willian Glez is a 24 y.o. male w/PMH notable for asthma presenting with SOB failung home rescue inhalers, initially evaluated or chest pain workup w/negative HS trop and EKG similar to baseline, observation for atypical chest pain monitoring possible asthma exacerbation, suspicios for acute pneumonitis in setting of self reported vaping     On exam pt is on RA and has occasional cough while converses, admitting to multiple ER visits for his SOB and chets tightness despite use of a rescue inhaler, at one time also using Spiriva. States almost impossible to avoid smokers at his workplace (which does not usually trigger sx per pt) however on a regular basis he does admit the atmosphere in [employer] iritates his lungs. This episode was preceded by vaping or new vaping and did not subside with around 20 uses of rescue short acting inhaler. Denies using other inhalants including smoking marijuana. Cannot recall having a long acting \"maintenance\" breathing medication for his asthma. Aware smoke exposure and vaping may trigger SOB, chest tightness, cough and wheezing.      Past Medical History  He has a past medical history of Bilateral intraabdominal testes, Cough, unspecified (2016), Lower abdominal pain, unspecified, Personal history of (healed) traumatic fracture (2016), Personal history of other diseases of the digestive system (2014),  , unspecified weeks of gestation ([facility]), Testicular pain, unspecified (2016), Tinnitus, bilateral (2020), and Unspecified fracture of unspecified metacarpal bone, initial encounter for closed fracture (2016).    Medication Hx   As above in HPI     Surgical History  He has a past surgical history that includes Other surgical history (2014) and Other surgical history (2014).     Social History  As in HPI. Vapes unknown substance, denies or does not clarify use of any " illicit/recreational drugs when asked    Family History  Reviewed as per chart  not immediately relevant to complaint      Allergies  Bee pollen and Peanut    Review of Systems   A 12 point ROS is negative unless otherwise  noted above     Physical Exam  GEN young  M avg habitus resting in ER bed but easily arousable to voice, NAD   HEENT NCAT appearing face/head, no scleral icterus, no exophthalmos, trachea midline no trismus   CV RR   PULM on RA, no conversational dyspnea,  good air movement appreciated  in bilat lung fileds w/o wheeze or stridor, occasional dry cough   ABD soft symmetric no guarding   EXT no pitting edema noted, moves all 4 limbs freely   NEURO axox4, no tremor noted gait not assessed   PSYCH euthymic mood and affect, no AVH, I/j fair      Last Recorded Vitals  /70   Pulse 97   Temp 37.2 °C (99 °F) (Oral)   Resp 18   Wt 68 kg (150 lb)   SpO2 95%     Relevant Results  STUDY:  Chest Radiograph;  12/4/2024 11:53PM  INDICATION:  Asthma exacerbation.  COMPARISON:  11/25/2024 XR Chest, 11/17/2024 XR Chest.  ACCESSION NUMBER(S):  [id number]  ORDERING CLINICIAN:  DONALD PATEL  TECHNIQUE:  Frontal chest was obtained at 2352 hours.  FINDINGS:  CARDIOMEDIASTINAL SILHOUETTE:  Cardiomediastinal silhouette is normal in size and configuration.     LUNGS:  Lungs are clear.     ABDOMEN:  No remarkable upper abdominal findings.     BONES:  No acute osseous changes.  IMPRESSION:  No acute cardiopulmonary disease.  Signed by Ken Alvarado     Assessment/Plan   Assessment & Plan  Atypical chest pain  #atypical chest ashleigh coronado musculoskeletal in context of acute pneumonitis w/ cough 2/2 vaping substance and hx likley suboptimal tx asthma. Unlikely acute ACS given ER workup and this exam/hx, unlikely infectious etiology. Rapidly Improved on this  exam compared to ER report likey w/initail ER interventions, some cough/subjective chest tightness/pleuritic irritation. No hypoxia. Added prednisone  40 mg po x 5 days, ex[ect further improvement w/addition of Breo, spiriva, optimize asthma mgt w/H2 blocker daily, mucinex prn, incentive spirometry and peak flow  #second hand tobacco smoke exposure, vaping (uncertain substance) discussed risk and limitations of maintenance  and rescue asthma regimens, encouraged avoidance of vapin/inhalants/smoke exposure   #Tachycardia in setting of excessive use of albuterol, improved on admit   #at risk for medication noncompliance, suggested to pt Meds to Beds before DC and follow up     Observation, anticipate < 2 midnights       Jessica Hall DO

## 2024-12-05 NOTE — ED PROCEDURE NOTE
Procedure  Critical Care    Performed by: Yariel Murcia MD  Authorized by: Yariel Murcia MD    Critical care provider statement:     Critical care time (minutes):  10    Critical care time was exclusive of:  Separately billable procedures and treating other patients and teaching time    Critical care was necessary to treat or prevent imminent or life-threatening deterioration of the following conditions:  Respiratory failure    Critical care was time spent personally by me on the following activities:  Development of treatment plan with patient or surrogate, examination of patient, evaluation of patient's response to treatment, obtaining history from patient or surrogate, ordering and performing treatments and interventions, ordering and review of laboratory studies, ordering and review of radiographic studies, pulse oximetry, re-evaluation of patient's condition and review of old charts               Yariel Murcia MD  12/05/24 0353

## 2024-12-05 NOTE — PROGRESS NOTES
Pharmacy Medication History Review    Willian Glez is a 24 y.o. male admitted for Atypical chest pain. Pharmacy reviewed the patient's [id number] medications and allergies for accuracy.    Medications ADDED:  Montelukas  Dayquil   Ibuprofen   Benadryl   Zyrtec   Medications CHANGED:  N/A  Medications REMOVED/NO LONGER TAKING:   Peridex  Protonix  Kenalog     The list below reflects the updated PTA list.   Prior to Admission Medications   Prescriptions Last Dose Informant   albuterol 90 mcg/actuation inhaler 12/5/2024 Morning Self   Sig: Inhale 2 puffs every 4 hours if needed for wheezing.   albuterol 90 mcg/actuation inhaler Not Taking Self   Sig: Inhale 2 puffs every 4 hours if needed for wheezing.   Patient not taking: Reported on 12/5/2024   cetirizine HCl (ZYRTEC ORAL)  Self   Sig: Take 1 tablet by mouth once daily as needed.   chlorhexidine (Peridex) 0.12 % solution Not Taking Self   Sig: Use 15 mL in the mouth or throat 3 times a day.   Patient not taking: Reported on 12/5/2024   d-methorphan/PE/acetaminophen (VICKS DAYQUIL COLD-FLU RELIEF ORAL) Past Month Self   Sig: Take 30 mL by mouth every 4 hours if needed.   diphenhydramine HCl (BENADRYL ALLERGY ORAL) Past Month Self   Sig: Take 2 tablets by mouth once daily as needed.   hydrOXYzine HCL (Atarax) 25 mg tablet Not Taking    Sig: Take 1 tablet (25 mg) by mouth every 8 hours if needed for anxiety for up to 5 days.   Patient not taking: Reported on 12/5/2024   ibuprofen (Ibuprofen IB) 200 mg tablet  Self   Sig: Take 2 tablets (400 mg) by mouth every 6 hours if needed for mild pain (1 - 3).   mometasone-formoterol (Dulera) 200-5 mcg/actuation inhaler Past Month Self   Sig: Inhale 2 puffs 2 times a day. Rinse mouth with water after use to reduce aftertaste and incidence of candidiasis. Do not swallow.   montelukast (Singulair) 4 mg chewable tablet 12/4/2024 Morning Self   Sig: Chew 1 tablet (4 mg) once daily at bedtime.   pantoprazole (ProtoNix) 20  "mg EC tablet Not Taking    Sig: Take 2 tablets (40 mg) by mouth once daily. Do not crush, chew, or split.   Patient not taking: Reported on 12/5/2024   triamcinolone (Kenalog) 0.1 % ointment Not Taking Self   Sig: Apply topically 2 times a day as needed for rash.   Patient not taking: Reported on 12/5/2024      Facility-Administered Medications: None        The list below reflects the updated allergy list. Please review each documented allergy for additional clarification and justification.  Allergies  Reviewed by Breanna Phoenix on 12/5/2024        Severity Reactions Comments    Bee Pollen Not Specified Other     Peanut Not Specified Unknown             Patient accepts M2B at discharge.     Sources:   [facility]  Pharmacy dispense history  Patient interview Good historian  Chart Review     Additional Comments:  N/A      MELISSA FLORES [place]  Pharmacy Technician  12/05/24     Secure Chat preferred   If no response call [id number] or [employer] \"Med Rec\"   "

## 2024-12-06 ENCOUNTER — PHARMACY VISIT (OUTPATIENT)
Dept: PHARMACY | Facility: CLINIC | Age: 24
End: 2024-12-06
Payer: COMMERCIAL

## 2024-12-06 VITALS
HEIGHT: 64 IN | BODY MASS INDEX: 25.61 KG/M2 | OXYGEN SATURATION: 100 % | TEMPERATURE: 98.6 F | DIASTOLIC BLOOD PRESSURE: 70 MMHG | HEART RATE: 98 BPM | SYSTOLIC BLOOD PRESSURE: 128 MMHG | WEIGHT: 150 LBS | RESPIRATION RATE: 14 BRPM

## 2024-12-06 PROCEDURE — 2500000001 HC RX 250 WO HCPCS SELF ADMINISTERED DRUGS (ALT 637 FOR MEDICARE OP): Performed by: STUDENT IN AN ORGANIZED HEALTH CARE EDUCATION/TRAINING PROGRAM

## 2024-12-06 PROCEDURE — 2500000002 HC RX 250 W HCPCS SELF ADMINISTERED DRUGS (ALT 637 FOR MEDICARE OP, ALT 636 FOR OP/ED): Performed by: STUDENT IN AN ORGANIZED HEALTH CARE EDUCATION/TRAINING PROGRAM

## 2024-12-06 PROCEDURE — 2500000004 HC RX 250 GENERAL PHARMACY W/ HCPCS (ALT 636 FOR OP/ED): Performed by: STUDENT IN AN ORGANIZED HEALTH CARE EDUCATION/TRAINING PROGRAM

## 2024-12-06 PROCEDURE — G0378 HOSPITAL OBSERVATION PER HR: HCPCS

## 2024-12-06 PROCEDURE — 99291 CRITICAL CARE FIRST HOUR: CPT | Performed by: INTERNAL MEDICINE

## 2024-12-06 PROCEDURE — RXMED WILLOW AMBULATORY MEDICATION CHARGE

## 2024-12-06 PROCEDURE — 94640 AIRWAY INHALATION TREATMENT: CPT

## 2024-12-06 RX ORDER — MOMETASONE FUROATE AND FORMOTEROL FUMARATE DIHYDRATE 200; 5 UG/1; UG/1
2 AEROSOL RESPIRATORY (INHALATION) 2 TIMES DAILY
Qty: 13 G | Refills: 0 | Status: SHIPPED | OUTPATIENT
Start: 2024-12-06

## 2024-12-06 RX ORDER — MONTELUKAST SODIUM 10 MG/1
10 TABLET ORAL NIGHTLY
Qty: 30 TABLET | Refills: 0 | Status: SHIPPED | OUTPATIENT
Start: 2024-12-06 | End: 2025-01-05

## 2024-12-06 RX ORDER — ALBUTEROL SULFATE 0.83 MG/ML
2.5 SOLUTION RESPIRATORY (INHALATION) EVERY 4 HOURS PRN
Qty: 270 ML | Refills: 0 | Status: SHIPPED | OUTPATIENT
Start: 2024-12-06

## 2024-12-06 RX ORDER — PREDNISONE 10 MG/1
TABLET ORAL
Qty: 20 TABLET | Refills: 0 | Status: SHIPPED | OUTPATIENT
Start: 2024-12-06 | End: 2024-12-14

## 2024-12-06 RX ORDER — ALBUTEROL SULFATE 90 UG/1
2 INHALANT RESPIRATORY (INHALATION) EVERY 4 HOURS PRN
Qty: 18 G | Refills: 0 | Status: SHIPPED | OUTPATIENT
Start: 2024-12-06

## 2024-12-06 ASSESSMENT — PAIN SCALES - GENERAL
PAINLEVEL_OUTOF10: 0 - NO PAIN

## 2024-12-06 NOTE — PROGRESS NOTES
Willian Glez is a 24 y.o. male on day 0 of admission presenting with Atypical chest pain.    [facility] made aware that patient is in need of nebulizer for discharge. Provider to write script for nebulizer.    Patient reports he will have [employer] pending January 1st. Email sent to [facility] pending to confirm insurance.    Constanza Maza, [facility]

## 2024-12-06 NOTE — PROGRESS NOTES
Willian Glez is a 24 y.o. male on day 0 of admission presenting with Atypical chest pain.    [facility] made aware that patient is in need of nebulizer for discharge. Provider to write script for nebulizer.    Constanza Maza

## 2024-12-06 NOTE — PROGRESS NOTES
Willian Glez is a 24 y.o. male on day 2 of admission presenting with Atypical chest pain.    Assessment/Plan   SW consulted due to inability to obtain medications. Provider in conversation with the pharmacy regarding potential costs. Spoke with registration and verified Pt's insurance is not yet active. Spoke with Pt and verified medications pending and Pt agreed to accept being billed for cost of medication from [facility].   Pt requesting ride home. Messaged [facility] and she agreed to do transport.     KARLY Dumont

## 2024-12-06 NOTE — DISCHARGE SUMMARY
Discharge Diagnosis  Atypical chest pain    Issues Requiring Follow-Up  Follow up with PCP and pulmologist    Discharge Meds     Medication List      START taking these medications     montelukast 10 mg tablet; Commonly known as: Singulair; Take 1 tablet   (10 mg) by mouth once daily at bedtime.; Replaces: montelukast 4 mg   chewable tablet   predniSONE 10 mg tablet; Commonly known as: Deltasone; Take 4 tablets   (40 mg) by mouth once daily for 2 days, THEN 3 tablets (30 mg) once daily   for 2 days, THEN 2 tablets (20 mg) once daily for 2 days, THEN 1 tablet   (10 mg) once daily for 2 days.; Start taking on: December 6, 2024     CHANGE how you take these medications     * albuterol 90 mcg/actuation inhaler; Inhale 2 puffs every 4 hours if   needed for wheezing.; What changed: Another medication with the same name   was added. Make sure you understand how and when to take each., Another   medication with the same name was removed. Continue taking this   medication, and follow the directions you see here.   * albuterol 2.5 mg /3 mL (0.083 %) nebulizer solution; Take 3 mL (2.5   mg) by nebulization every 4 hours if needed for wheezing or shortness of   breath.; What changed: You were already taking a medication with the same   name, and this prescription was added. Make sure you understand how and   when to take each.; Replaces: albuterol 90 mcg/actuation inhaler  * This list has 2 medication(s) that are the same as other medications   prescribed for you. Read the directions carefully, and ask your doctor or   other care provider to review them with you.     CONTINUE taking these medications     BENADRYL ALLERGY ORAL   Dulera 200-5 mcg/actuation inhaler; Generic drug: mometasone-formoterol;   Inhale 2 puffs 2 times a day. Rinse mouth with water after use to reduce   aftertaste and incidence of candidiasis. Do not swallow.   Ibuprofen  mg tablet; Generic drug: ibuprofen   VICKS DAYQUIL COLD-FLU RELIEF ORAL    "ZYRTEC ORAL     STOP taking these medications     albuterol 90 mcg/actuation inhaler; Replaced by: albuterol 2.5 mg /3 mL   (0.083 %) nebulizer solution; You also have another medication with the   same name that you need to continue taking as instructed.   chlorhexidine 0.12 % solution; Commonly known as: Peridex   hydrOXYzine HCL 25 mg tablet; Commonly known as: Atarax   montelukast 4 mg chewable tablet; Commonly known as: Singulair; Replaced   by: montelukast 10 mg tablet   pantoprazole 20 mg EC tablet; Commonly known as: ProtoNix   triamcinolone 0.1 % ointment; Commonly known as: Kenalog       Test Results Pending At Discharge  Pending Labs       No current pending labs.            Hospital Course   Willian Glez is a 24 y.o. male w/PMH notable for asthma presenting with SOB failung home rescue inhalers, initially evaluated or chest pain workup w/negative HS trop and EKG similar to baseline, observation for atypical chest pain monitoring possible asthma exacerbation, suspicios for acute pneumonitis in setting of self reported vaping      On exam pt is on RA and has occasional cough while converses, admitting to multiple ER visits for his SOB and chets tightness despite use of a rescue inhaler, at one time also using Spiriva. States almost impossible to avoid smokers at his workplace (which does not usually trigger sx per pt) however on a regular basis he does admit the atmosphere in [employer] iritates his lungs. This episode was preceded by vaping or new vaping and did not subside with around 20 uses of rescue short acting inhaler. Denies using other inhalants including smoking marijuana. Cannot recall having a long acting \"maintenance\" breathing medication for his asthma. Aware smoke exposure and vaping may trigger SOB, chest tightness, cough and wheezing.   Patient was admitted for further management, with atypical chest pain thought to be musculoskeletal in etiology.  Given cough, and imaging " finding of acute pneumonitis likely from vaping.  No antibiotic initiated, and corticosteroids with Solu-Medrol he was put on bronchodilators which was transitioned to prednisone taper.  For  his comorbidities, home medication was continued.  Patient improving gradually, and was back to baseline and stable for discharge on December 6, 2024 with follow-up with PCP.      Greater than 30 minutes dedicated to this discharge that included educating patient and coordinating care    Pertinent Physical Exam At Time of Discharge  Physical Exam  Constitutional: Pleasant alert active, cooperative not in acute distress  Eyes: PERRLA, clear sclera  ENMT: Moist mucosal membranes, no exudate  Head / Neck: Atraumatic, normocephalic, supple neck, JVP not visualized  Lungs: Patent airways, CTABL  Heart: RRR, S1S2, no murmurs appreciated, palpable pulses in all extremities  GI: Soft, NT, ND, bowel sounds present in all quadrants  MSK: Moves all extremities freely, no restriction  of ROM, no joint edema  Extremities: Intact x 4, no peripheral edema  : No Gutierrez catheter inserted  Breast: Deferred  Neurological: AAO x 3 to person, place and date, facial muscles symmetrical, sensation intact, strength 4/4, no acute focal neurological deficits appreciated  Psychological: Appropriate mood and behavior    Outpatient Follow-Up  Future Appointments   Date Time Provider Department Center   12/10/2024  1:40 PM AMANDA Aleman-CNP [id number] [facility]   12/19/2024  2:00 PM Espinoza Spain MD [id number] [facility]         Armando Velez DO

## 2024-12-10 ENCOUNTER — APPOINTMENT (OUTPATIENT)
Dept: UROLOGY | Facility: CLINIC | Age: 24
End: 2024-12-10

## 2024-12-27 ENCOUNTER — APPOINTMENT (OUTPATIENT)
Dept: PRIMARY CARE | Facility: CLINIC | Age: 24
End: 2024-12-27

## 2025-03-03 ENCOUNTER — HOSPITAL ENCOUNTER (EMERGENCY)
Facility: HOSPITAL | Age: 25
Discharge: HOME | End: 2025-03-03
Attending: EMERGENCY MEDICINE
Payer: MEDICAID

## 2025-03-03 ENCOUNTER — CLINICAL SUPPORT (OUTPATIENT)
Dept: EMERGENCY MEDICINE | Facility: HOSPITAL | Age: 25
End: 2025-03-03
Payer: MEDICAID

## 2025-03-03 VITALS
TEMPERATURE: 97.8 F | HEIGHT: 64 IN | RESPIRATION RATE: 16 BRPM | HEART RATE: 96 BPM | BODY MASS INDEX: 25.61 KG/M2 | WEIGHT: 150 LBS | DIASTOLIC BLOOD PRESSURE: 85 MMHG | SYSTOLIC BLOOD PRESSURE: 124 MMHG | OXYGEN SATURATION: 96 %

## 2025-03-03 DIAGNOSIS — J45.901 MILD ASTHMA WITH EXACERBATION, UNSPECIFIED WHETHER PERSISTENT (HHS-HCC): Primary | ICD-10-CM

## 2025-03-03 PROCEDURE — 2500000004 HC RX 250 GENERAL PHARMACY W/ HCPCS (ALT 636 FOR OP/ED): Performed by: STUDENT IN AN ORGANIZED HEALTH CARE EDUCATION/TRAINING PROGRAM

## 2025-03-03 PROCEDURE — 2500000001 HC RX 250 WO HCPCS SELF ADMINISTERED DRUGS (ALT 637 FOR MEDICARE OP): Performed by: STUDENT IN AN ORGANIZED HEALTH CARE EDUCATION/TRAINING PROGRAM

## 2025-03-03 PROCEDURE — 99284 EMERGENCY DEPT VISIT MOD MDM: CPT | Performed by: EMERGENCY MEDICINE

## 2025-03-03 PROCEDURE — 99283 EMERGENCY DEPT VISIT LOW MDM: CPT | Mod: 25 | Performed by: EMERGENCY MEDICINE

## 2025-03-03 PROCEDURE — 93005 ELECTROCARDIOGRAM TRACING: CPT

## 2025-03-03 PROCEDURE — 94640 AIRWAY INHALATION TREATMENT: CPT

## 2025-03-03 RX ORDER — DEXAMETHASONE 6 MG/1
6 TABLET ORAL ONCE
Status: COMPLETED | OUTPATIENT
Start: 2025-03-03 | End: 2025-03-03

## 2025-03-03 RX ORDER — ALBUTEROL SULFATE 90 UG/1
2 INHALANT RESPIRATORY (INHALATION) ONCE
Status: COMPLETED | OUTPATIENT
Start: 2025-03-03 | End: 2025-03-03

## 2025-03-03 RX ORDER — MONTELUKAST SODIUM 10 MG/1
10 TABLET ORAL NIGHTLY
Qty: 30 TABLET | Refills: 0 | Status: SHIPPED | OUTPATIENT
Start: 2025-03-03 | End: 2026-03-03

## 2025-03-03 RX ADMIN — DEXAMETHASONE 6 MG: 6 TABLET ORAL at 20:49

## 2025-03-03 RX ADMIN — ALBUTEROL SULFATE 2 PUFF: 90 AEROSOL, METERED RESPIRATORY (INHALATION) at 21:14

## 2025-03-03 ASSESSMENT — PAIN - FUNCTIONAL ASSESSMENT: PAIN_FUNCTIONAL_ASSESSMENT: 0-10

## 2025-03-03 ASSESSMENT — PAIN SCALES - GENERAL: PAINLEVEL_OUTOF10: 0 - NO PAIN

## 2025-03-04 LAB
ATRIAL RATE: 98 BPM
P AXIS: 64 DEGREES
P OFFSET: 206 MS
P ONSET: 154 MS
PR INTERVAL: 138 MS
Q ONSET: 223 MS
QRS COUNT: 16 BEATS
QRS DURATION: 88 MS
QT INTERVAL: 320 MS
QTC CALCULATION(BAZETT): 408 MS
QTC FREDERICIA: 377 MS
R AXIS: 62 DEGREES
T AXIS: 5 DEGREES
T OFFSET: 383 MS
VENTRICULAR RATE: 98 BPM

## 2025-03-04 PROCEDURE — 93010 ELECTROCARDIOGRAM REPORT: CPT | Performed by: PHYSICIAN ASSISTANT

## 2025-03-04 NOTE — DISCHARGE INSTRUCTIONS
Discharge home with reassurance.  Please take your albuterol as prescribed.  As well as montelukast.  Please follow-up with your PCP for further management.  The steroid you were given today should help for greater than 24 hours.  Try and avoid as many environmental triggers as possible until you are able to get your medications

## 2025-03-04 NOTE — ED TRIAGE NOTES
"Patient came by EMS due to shortness of breath, chest tightness, and dizziness that began \"the past couple days.\" Patient took home inhaler with no relief. History of asthma.   "

## 2025-03-04 NOTE — ED PROVIDER NOTES
Emergency Department Provider Note        History of Present Illness     History provided by: Patient  Limitations to History: None  External Records Reviewed with Brief Summary: None    HPI:  Willian Glez is a 24 y.o. male presenting with concerns of asthma exacerbation.  Patient notes he has been out of his medication for a month because he does not have insurance or money at this time.  He only has his inhaler which he has been taking however he is down about 15 puffs left.  He also felt like his heart was racing however that after taking extra doses of albuterol.  He has an interview tomorrow for a job and is hopeful that will help out.  He is wanting to see if he is able to get any medications while being here to try and help him more long-term for this    Physical Exam   Triage vitals:  T 36.6 °C (97.8 °F)  HR 96  /85  RR 16  O2 96 % None (Room air)    Physical Exam  Vitals reviewed.   Constitutional:       Appearance: Normal appearance.   Eyes:      General:         Right eye: No discharge.         Left eye: No discharge.   Cardiovascular:      Rate and Rhythm: Normal rate.   Pulmonary:      Effort: Pulmonary effort is normal. No respiratory distress.   Skin:     General: Skin is warm and dry.      Capillary Refill: Capillary refill takes less than 2 seconds.   Neurological:      Mental Status: He is alert and oriented to person, place, and time.   Psychiatric:         Mood and Affect: Mood normal.         Behavior: Behavior normal.          Medical Decision Making & ED Course   Medical Decision Makin y.o. male  Presenting as per HPI, differential is broad and includes but not limited to asthma exacerbation, seasonal allergies, viral URI.   As a result, workup was considered including imaging, blood work however patient was very hemodynamically stable, and with regards to risk versus benefit there did not appear to be a clinical question that would be identified with this at the time so  through shared decision making it was ultimately deferred.  We did obtain EKG which did not have any notable arrhythmia or QT prolongation.  additionally after discussed with the patient, it was agreeable that patient would discharge home with albuterol after receiving 2 puffs here, patient was also given Decadron as it is the longest acting steroid available to try and help with prolonged recovery until he is able to get medications again.    ----    Differential diagnoses considered include but are not limited to: As per [facility]    Chronic conditions affecting the patient's care: As documented above in [facility]    Care Considerations: As documented above in [facility]    ED Course:  ED Course as of 03/03/25 2120   Mon Mar 03, 2025   2111 Presents with mild shortness of breath which is better when I see him.  He states that he ran out of his montelukast and Dulera about a month ago and is unable to afford new ones.  He does not have insurance but is hoping to have a job and insurance in the next couple of days.  He states that this is happening for.  He thinks it is from not having his baseline medications.  He has been using his inhaler about 10-15 times a day over the last week and has since run out.  His lungs are clear, he is very well-appearing.  He describes some chest tightness as well which she states he was told in the past that she has had this multiple times in the past that it was from inflammation.  He had a single dose of Dex here and he will be provided an albuterol inhaler.  His montelukast will be sent to his pharmacy and he will be able to pick it up as soon as he has money for it.  Patient is comfortable for this plan. [YT]      ED Course User Index  [YT] Dayana Macias MD         Diagnoses as of 03/03/25 2120   Mild asthma with exacerbation, unspecified whether persistent ([facility])     Disposition   As a result of the work-up, the patient was discharged home.  he was informed of his diagnosis and instructed to  come back with any concerns or worsening of condition.  he and was agreeable to the plan as discussed above.  he was given the opportunity to ask questions.  All of the patient's questions were answered.    Procedures   Procedures    Faustino Salvador DO  Emergency Medicine      Faustino Salvador DO  Resident  03/03/25 9564

## 2025-03-11 ENCOUNTER — HOSPITAL ENCOUNTER (EMERGENCY)
Facility: HOSPITAL | Age: 25
Discharge: HOME | End: 2025-03-12
Payer: MEDICAID

## 2025-03-11 ENCOUNTER — CLINICAL SUPPORT (OUTPATIENT)
Dept: EMERGENCY MEDICINE | Facility: HOSPITAL | Age: 25
End: 2025-03-11
Payer: MEDICAID

## 2025-03-11 ENCOUNTER — APPOINTMENT (OUTPATIENT)
Dept: RADIOLOGY | Facility: HOSPITAL | Age: 25
End: 2025-03-11
Payer: MEDICAID

## 2025-03-11 VITALS
RESPIRATION RATE: 18 BRPM | TEMPERATURE: 98.5 F | SYSTOLIC BLOOD PRESSURE: 133 MMHG | OXYGEN SATURATION: 100 % | DIASTOLIC BLOOD PRESSURE: 80 MMHG | HEIGHT: 65 IN | HEART RATE: 102 BPM | BODY MASS INDEX: 24.99 KG/M2 | WEIGHT: 150 LBS

## 2025-03-11 DIAGNOSIS — R06.02 SHORTNESS OF BREATH: Primary | ICD-10-CM

## 2025-03-11 DIAGNOSIS — J45.909 UNSPECIFIED ASTHMA, UNCOMPLICATED (HHS-HCC): ICD-10-CM

## 2025-03-11 LAB
ALBUMIN SERPL BCP-MCNC: 4.8 G/DL (ref 3.4–5)
ALP SERPL-CCNC: 49 U/L (ref 33–120)
ALT SERPL W P-5'-P-CCNC: 19 U/L (ref 10–52)
ANION GAP SERPL CALC-SCNC: 14 MMOL/L (ref 10–20)
AST SERPL W P-5'-P-CCNC: 26 U/L (ref 9–39)
BILIRUB SERPL-MCNC: 0.4 MG/DL (ref 0–1.2)
BNP SERPL-MCNC: <2 PG/ML (ref 0–99)
BUN SERPL-MCNC: 14 MG/DL (ref 6–23)
CALCIUM SERPL-MCNC: 9.6 MG/DL (ref 8.6–10.6)
CARDIAC TROPONIN I PNL SERPL HS: <3 NG/L (ref 0–53)
CHLORIDE SERPL-SCNC: 100 MMOL/L (ref 98–107)
CO2 SERPL-SCNC: 28 MMOL/L (ref 21–32)
CREAT SERPL-MCNC: 0.93 MG/DL (ref 0.5–1.3)
D DIMER PPP FEU-MCNC: 636 NG/ML FEU
EGFRCR SERPLBLD CKD-EPI 2021: >90 ML/MIN/1.73M*2
GLUCOSE SERPL-MCNC: 83 MG/DL (ref 74–99)
POTASSIUM SERPL-SCNC: 4.7 MMOL/L (ref 3.5–5.3)
PROT SERPL-MCNC: 7.6 G/DL (ref 6.4–8.2)
SODIUM SERPL-SCNC: 137 MMOL/L (ref 136–145)

## 2025-03-11 PROCEDURE — 36415 COLL VENOUS BLD VENIPUNCTURE: CPT

## 2025-03-11 PROCEDURE — 71046 X-RAY EXAM CHEST 2 VIEWS: CPT

## 2025-03-11 PROCEDURE — 2500000004 HC RX 250 GENERAL PHARMACY W/ HCPCS (ALT 636 FOR OP/ED): Mod: SE

## 2025-03-11 PROCEDURE — 99285 EMERGENCY DEPT VISIT HI MDM: CPT | Mod: 25

## 2025-03-11 PROCEDURE — 83880 ASSAY OF NATRIURETIC PEPTIDE: CPT

## 2025-03-11 PROCEDURE — 99285 EMERGENCY DEPT VISIT HI MDM: CPT

## 2025-03-11 PROCEDURE — 84075 ASSAY ALKALINE PHOSPHATASE: CPT

## 2025-03-11 PROCEDURE — 2500000002 HC RX 250 W HCPCS SELF ADMINISTERED DRUGS (ALT 637 FOR MEDICARE OP, ALT 636 FOR OP/ED): Mod: SE

## 2025-03-11 PROCEDURE — 84484 ASSAY OF TROPONIN QUANT: CPT

## 2025-03-11 PROCEDURE — 93005 ELECTROCARDIOGRAM TRACING: CPT

## 2025-03-11 PROCEDURE — 94640 AIRWAY INHALATION TREATMENT: CPT

## 2025-03-11 PROCEDURE — 85379 FIBRIN DEGRADATION QUANT: CPT

## 2025-03-11 PROCEDURE — 71046 X-RAY EXAM CHEST 2 VIEWS: CPT | Performed by: STUDENT IN AN ORGANIZED HEALTH CARE EDUCATION/TRAINING PROGRAM

## 2025-03-11 RX ORDER — MOMETASONE FUROATE AND FORMOTEROL FUMARATE DIHYDRATE 200; 5 UG/1; UG/1
2 AEROSOL RESPIRATORY (INHALATION) 2 TIMES DAILY
Qty: 13 G | Refills: 0 | Status: SHIPPED | OUTPATIENT
Start: 2025-03-11

## 2025-03-11 RX ORDER — IPRATROPIUM BROMIDE AND ALBUTEROL SULFATE 2.5; .5 MG/3ML; MG/3ML
3 SOLUTION RESPIRATORY (INHALATION) ONCE
Status: COMPLETED | OUTPATIENT
Start: 2025-03-11 | End: 2025-03-11

## 2025-03-11 RX ORDER — PREDNISONE 50 MG/1
50 TABLET ORAL DAILY
Qty: 5 TABLET | Refills: 0 | Status: SHIPPED | OUTPATIENT
Start: 2025-03-11 | End: 2025-03-16

## 2025-03-11 RX ADMIN — PREDNISONE 50 MG: 10 TABLET ORAL at 21:32

## 2025-03-11 RX ADMIN — IPRATROPIUM BROMIDE AND ALBUTEROL SULFATE 3 ML: .5; 3 SOLUTION RESPIRATORY (INHALATION) at 21:32

## 2025-03-11 ASSESSMENT — PAIN DESCRIPTION - PAIN TYPE: TYPE: ACUTE PAIN

## 2025-03-11 ASSESSMENT — LIFESTYLE VARIABLES
EVER HAD A DRINK FIRST THING IN THE MORNING TO STEADY YOUR NERVES TO GET RID OF A HANGOVER: NO
HAVE PEOPLE ANNOYED YOU BY CRITICIZING YOUR DRINKING: NO
EVER FELT BAD OR GUILTY ABOUT YOUR DRINKING: NO
HAVE YOU EVER FELT YOU SHOULD CUT DOWN ON YOUR DRINKING: NO
TOTAL SCORE: 0

## 2025-03-11 ASSESSMENT — PAIN DESCRIPTION - FREQUENCY: FREQUENCY: INTERMITTENT

## 2025-03-11 ASSESSMENT — PAIN DESCRIPTION - ORIENTATION: ORIENTATION: MID

## 2025-03-11 ASSESSMENT — COLUMBIA-SUICIDE SEVERITY RATING SCALE - C-SSRS
1. IN THE PAST MONTH, HAVE YOU WISHED YOU WERE DEAD OR WISHED YOU COULD GO TO SLEEP AND NOT WAKE UP?: NO
2. HAVE YOU ACTUALLY HAD ANY THOUGHTS OF KILLING YOURSELF?: NO
6. HAVE YOU EVER DONE ANYTHING, STARTED TO DO ANYTHING, OR PREPARED TO DO ANYTHING TO END YOUR LIFE?: NO

## 2025-03-11 ASSESSMENT — PAIN DESCRIPTION - ONSET: ONSET: GRADUAL

## 2025-03-11 ASSESSMENT — PAIN SCALES - GENERAL: PAINLEVEL_OUTOF10: 8

## 2025-03-11 ASSESSMENT — PAIN DESCRIPTION - LOCATION: LOCATION: CHEST

## 2025-03-11 ASSESSMENT — PAIN DESCRIPTION - PROGRESSION: CLINICAL_PROGRESSION: NOT CHANGED

## 2025-03-11 ASSESSMENT — PAIN DESCRIPTION - DESCRIPTORS: DESCRIPTORS: TIGHTNESS

## 2025-03-11 ASSESSMENT — PAIN - FUNCTIONAL ASSESSMENT: PAIN_FUNCTIONAL_ASSESSMENT: 0-10

## 2025-03-11 NOTE — ED TRIAGE NOTES
Patient presents to the ED with complaint of shortness of breathe with non radiating mid sternal chest pain for 2-3 weeks. Took albuterol inhaler prior to arrival which hasn't given much relief. PMH asthma.

## 2025-03-12 LAB
ATRIAL RATE: 95 BPM
P AXIS: 66 DEGREES
P OFFSET: 201 MS
P ONSET: 152 MS
PR INTERVAL: 136 MS
Q ONSET: 220 MS
QRS COUNT: 16 BEATS
QRS DURATION: 84 MS
QT INTERVAL: 326 MS
QTC CALCULATION(BAZETT): 409 MS
QTC FREDERICIA: 379 MS
R AXIS: 67 DEGREES
T AXIS: 29 DEGREES
T OFFSET: 383 MS
VENTRICULAR RATE: 95 BPM

## 2025-03-12 ASSESSMENT — HEART SCORE
ECG: NORMAL
HISTORY: SLIGHTLY SUSPICIOUS
TROPONIN: LESS THAN OR EQUAL TO NORMAL LIMIT
AGE: <45
HEART SCORE: 0
RISK FACTORS: NO KNOWN RISK FACTORS

## 2025-03-12 NOTE — DISCHARGE INSTRUCTIONS
I sent in refills of your inhaler.  I also sent in steroids.  You need to follow-up with primary care due to continued shortness of breath.  Shortness of breath worsens, come back to ER

## 2025-03-12 NOTE — ED PROVIDER NOTES
HPI   Chief Complaint   Patient presents with    Shortness of Breath    Chest Pain       24-year-old male with PMH of migraines, asthma presenting today for shortness of breath.  States that he has been experiencing intermittent shortness of breath for the last 2 weeks.  Ran out of his Dulera inhaler a month ago.  Seen in the ED for asthma exacerbation 8 days ago.  Has been using his albuterol inhaler daily approximately 5 times a day.  Endorsing chest pain.  States that the chest pain is described as tightness band across his entire chest.  Also reports it sometimes feels as if someone is pressing or punching his chest.  Denies palpitations.  Reports that he has been experiencing intermittent swelling to his bilateral feet for a few months now.  No swelling to his calf, no cough, no flu like symptoms.  Just use his albuterol inhaler prior to arrival though still feels short of breath.              Patient History   Past Medical History:   Diagnosis Date    Bilateral intraabdominal testes     Bilateral cryptorchidism    Cough, unspecified 2016    Cough    Lower abdominal pain, unspecified     Groin pain    Personal history of (healed) traumatic fracture 2016    History of fracture of clavicle    Personal history of other diseases of the digestive system 2014    History of gastroesophageal reflux (GERD)     , unspecified weeks of gestation ([facility])     Prematurity    Testicular pain, unspecified 2016    Testicular pain    Tinnitus, bilateral 2020    Tinnitus of both ears    Unspecified fracture of unspecified metacarpal bone, initial encounter for closed fracture 2016    Fracture, metacarpal     Past Surgical History:   Procedure Laterality Date    OTHER SURGICAL HISTORY  2014    Surgery Testis    OTHER SURGICAL HISTORY  2014    Surgery Testis Orchiopexy, Inguinal Approach     No family history on file.  Social History     Tobacco Use    Smoking status:  Never    Smokeless tobacco: Never   Vaping Use    Vaping status: Never Used   Substance Use Topics    Alcohol use: Never    Drug use: Never       Physical Exam   ED Triage Vitals [03/11/25 1923]   Temperature Heart Rate Respirations BP   36.9 °C (98.5 °F) (!) 102 18 133/80      Pulse Ox Temp Source Heart Rate Source Patient Position   100 % Temporal Monitor Sitting      BP Location FiO2 (%)     Left arm --       Physical Exam  Vitals and nursing note reviewed.   Constitutional:       General: He is not in acute distress.     Appearance: Normal appearance. He is not ill-appearing.   HENT:      Head: Normocephalic and atraumatic.      Right Ear: External ear normal.      Left Ear: External ear normal.      Nose: Nose normal.      Mouth/Throat:      Mouth: Mucous membranes are moist.   Eyes:      Extraocular Movements: Extraocular movements intact.      Conjunctiva/sclera: Conjunctivae normal.      Pupils: Pupils are equal, round, and reactive to light.   Cardiovascular:      Rate and Rhythm: Normal rate and regular rhythm.      Heart sounds: Normal heart sounds.   Pulmonary:      Effort: No accessory muscle usage or respiratory distress.      Breath sounds: Normal breath sounds. No wheezing, rhonchi or rales.   Abdominal:      General: Abdomen is flat. Bowel sounds are normal. There is no distension.      Palpations: Abdomen is soft.      Tenderness: There is no abdominal tenderness. There is no right CVA tenderness or left CVA tenderness.   Musculoskeletal:         General: No swelling or deformity. Normal range of motion.      Cervical back: Normal range of motion and neck supple.      Right lower leg: No edema.      Left lower leg: No edema.   Skin:     General: Skin is warm and dry.      Capillary Refill: Capillary refill takes less than 2 seconds.   Neurological:      General: No focal deficit present.      Mental Status: He is alert and oriented to person, place, and time.      GCS: GCS eye subscore is 4. GCS  "verbal subscore is 5. GCS motor subscore is 6.      Cranial Nerves: Cranial nerves 2-12 are intact.      Sensory: No sensory deficit.      Motor: Motor function is intact. No weakness.   Psychiatric:         Mood and Affect: Mood and affect normal.         Speech: Speech normal.         Behavior: Behavior normal. Behavior is cooperative.           ED Course & [facility]   ED Course as of 03/12/25 0015   Tue Mar 11, 2025   2101 ECG 12 lead  NSR with sinus arrhythmia.  95 bpm.  .  QTc 409.  No ST elevation or new T wave inversion.  No acute changes as compared to previous EKG.  Normal axis. [ML]   2133 Patient refused to let me look at his feet. Did discuss the importance of looking at his feet since he notes he's had intermittent swelling to feet for some time. Discussed differential diagnoses of blood clots, chf, ect, patient still declines exam. States that \"there is not current swelling to his feet so there is no need for exam\": [ML]   2232 XR chest 2 views  1. No evidence of acute cardiopulmonary process.   [ML]   2326 D-Dimer, VTE Exclusion(!)  Using years criteria, can rule out PE. No need for CT PE scan [ML]   2327 Troponin I, High Sensitivity (CMC): <3  Negative. No concern for ACS [ML]   2350 BNP: <2  No concern for chf [ML]      ED Course User Index  [ML] Alyssa Archibald PA-C         Diagnoses as of 03/12/25 0015   Shortness of breath                 No data recorded     Leatha Coma Scale Score: 15 (03/11/25 1927 : Fletcher Lacey RN)                           Medical Decision Making  24-year-old male presenting today for intermittent shortness of breath.  Symptoms ongoing for 2 weeks.  Seen on 3/3, a week ago and diagnosed with asthma exacerbation.  Sent home with montelukast prescription refill.  Was also given dexamethasone here in the ER.  On my exam, lungs are clear.  Patient states that he just use his albuterol inhaler prior to arrival.  Still having chest tightness.  Saturating appropriately on room air " at 100%.  Mildly tachycardic with a heart rate of 102.  No flu/sick symptoms.  Chest pain generalized, associated with shortness of breath.  States that he is experiencing intermittent swelling to his feet.  Patient declined exam of his bilateral feet.  He is let me look at his scabs though I did perform exam externally without raising his legs and I feel no signs/symptoms of swelling or edema to the calf.  Considering ongoing symptoms of shortness of breath and chest tightness and physical exam findings of asthma exacerbation noted on my exam, will obtain chest x-ray, EKG, troponin to rule out ACS, D-dimer to rule out PE, BNP to rule out CHF considering he is having the leg swelling.  Will give patient DuoNeb and prednisone in hopes to help relieve his symptoms.  Will also send in his Dulera inhaler as he ran out a month ago which could be the cause of his worsening symptoms.    Chest x-ray without evidence of acute cardiopulmonary processes.  EKG showing no signs of ischemia.  Troponin negative.  No concerns for ACS.  Score 0.  BNP negative, no concerns for CHF.  D-dimer mildly elevated at 636 though using years criteria can rule out PE.  On reevaluation, feeling better.  Will send in refill of his Dulera and 5-day course of prednisone.  Discussed return precautions and that he needs to follow-up with primary care.  Considering his continued symptoms.          Procedure  Procedures     Alyssa Archibald PA-C  03/12/25 0016

## 2025-03-28 ENCOUNTER — TELEPHONE (OUTPATIENT)
Dept: PEDIATRICS | Facility: CLINIC | Age: 25
End: 2025-03-28
Payer: MEDICAID

## 2025-03-28 DIAGNOSIS — J45.901 MILD ASTHMA WITH EXACERBATION, UNSPECIFIED WHETHER PERSISTENT (HHS-HCC): ICD-10-CM

## 2025-03-28 DIAGNOSIS — J45.901 ASTHMA EXACERBATION, MILD (HHS-HCC): ICD-10-CM

## 2025-03-28 RX ORDER — ALBUTEROL SULFATE 90 UG/1
2 INHALANT RESPIRATORY (INHALATION) EVERY 4 HOURS PRN
Qty: 18 G | Refills: 1 | Status: SHIPPED | OUTPATIENT
Start: 2025-03-28

## 2025-03-28 RX ORDER — MONTELUKAST SODIUM 10 MG/1
10 TABLET ORAL NIGHTLY
Qty: 30 TABLET | Refills: 1 | Status: SHIPPED | OUTPATIENT
Start: 2025-03-28 | End: 2026-03-28

## 2025-03-28 NOTE — TELEPHONE ENCOUNTER
Please let him know I refilled his Albuterol and Singulair -- he definitely needs a primary care doctor.  In addition, he should see adult pulmonology for his asthma -- he can call [phone] to make an appointment for both pulmonology for asthma and primary care.

## 2025-03-28 NOTE — TELEPHONE ENCOUNTER
Willian is looking for a new adult PCP currently. I advised him, that he needed to find an adult PCP. He is wondering if you will still write a script for an Albuterol inhaler, and montelukast? Last office visit was January 2023. I offered him phone numbers of adult PCP's. Please advise. Thanks     [phone]

## 2025-04-07 ENCOUNTER — HOSPITAL ENCOUNTER (EMERGENCY)
Facility: HOSPITAL | Age: 25
Discharge: HOME | End: 2025-04-07
Payer: MEDICAID

## 2025-04-07 ENCOUNTER — APPOINTMENT (OUTPATIENT)
Dept: RADIOLOGY | Facility: HOSPITAL | Age: 25
End: 2025-04-07
Payer: MEDICAID

## 2025-04-07 ENCOUNTER — CLINICAL SUPPORT (OUTPATIENT)
Dept: EMERGENCY MEDICINE | Facility: HOSPITAL | Age: 25
End: 2025-04-07
Payer: MEDICAID

## 2025-04-07 VITALS
HEART RATE: 88 BPM | WEIGHT: 150 LBS | DIASTOLIC BLOOD PRESSURE: 80 MMHG | HEIGHT: 65 IN | OXYGEN SATURATION: 95 % | RESPIRATION RATE: 18 BRPM | BODY MASS INDEX: 24.99 KG/M2 | SYSTOLIC BLOOD PRESSURE: 134 MMHG | TEMPERATURE: 98 F

## 2025-04-07 DIAGNOSIS — J45.909 ASTHMA, UNSPECIFIED ASTHMA SEVERITY, UNSPECIFIED WHETHER COMPLICATED, UNSPECIFIED WHETHER PERSISTENT (HHS-HCC): Primary | ICD-10-CM

## 2025-04-07 DIAGNOSIS — F41.9 ANXIETY: ICD-10-CM

## 2025-04-07 LAB
ANION GAP SERPL CALC-SCNC: 15 MMOL/L (ref 10–20)
BASOPHILS # BLD AUTO: 0.02 X10*3/UL (ref 0–0.1)
BASOPHILS NFR BLD AUTO: 0.4 %
BUN SERPL-MCNC: 9 MG/DL (ref 6–23)
CALCIUM SERPL-MCNC: 9.5 MG/DL (ref 8.6–10.6)
CARDIAC TROPONIN I PNL SERPL HS: <3 NG/L (ref 0–53)
CHLORIDE SERPL-SCNC: 101 MMOL/L (ref 98–107)
CO2 SERPL-SCNC: 27 MMOL/L (ref 21–32)
CREAT SERPL-MCNC: 0.8 MG/DL (ref 0.5–1.3)
EGFRCR SERPLBLD CKD-EPI 2021: >90 ML/MIN/1.73M*2
EOSINOPHIL # BLD AUTO: 0.16 X10*3/UL (ref 0–0.7)
EOSINOPHIL NFR BLD AUTO: 2.9 %
ERYTHROCYTE [DISTWIDTH] IN BLOOD BY AUTOMATED COUNT: 13.1 % (ref 11.5–14.5)
GLUCOSE SERPL-MCNC: 83 MG/DL (ref 74–99)
HCT VFR BLD AUTO: 39.5 % (ref 41–52)
HGB BLD-MCNC: 12.8 G/DL (ref 13.5–17.5)
IMM GRANULOCYTES # BLD AUTO: 0 X10*3/UL (ref 0–0.7)
IMM GRANULOCYTES NFR BLD AUTO: 0 % (ref 0–0.9)
LYMPHOCYTES # BLD AUTO: 2.33 X10*3/UL (ref 1.2–4.8)
LYMPHOCYTES NFR BLD AUTO: 41.8 %
MCH RBC QN AUTO: 25.1 PG (ref 26–34)
MCHC RBC AUTO-ENTMCNC: 32.4 G/DL (ref 32–36)
MCV RBC AUTO: 78 FL (ref 80–100)
MONOCYTES # BLD AUTO: 0.48 X10*3/UL (ref 0.1–1)
MONOCYTES NFR BLD AUTO: 8.6 %
NEUTROPHILS # BLD AUTO: 2.59 X10*3/UL (ref 1.2–7.7)
NEUTROPHILS NFR BLD AUTO: 46.3 %
NRBC BLD-RTO: 0 /100 WBCS (ref 0–0)
PLATELET # BLD AUTO: 223 X10*3/UL (ref 150–450)
POTASSIUM SERPL-SCNC: 4 MMOL/L (ref 3.5–5.3)
RBC # BLD AUTO: 5.09 X10*6/UL (ref 4.5–5.9)
SODIUM SERPL-SCNC: 139 MMOL/L (ref 136–145)
WBC # BLD AUTO: 5.6 X10*3/UL (ref 4.4–11.3)

## 2025-04-07 PROCEDURE — 80048 BASIC METABOLIC PNL TOTAL CA: CPT

## 2025-04-07 PROCEDURE — 36415 COLL VENOUS BLD VENIPUNCTURE: CPT

## 2025-04-07 PROCEDURE — 93005 ELECTROCARDIOGRAM TRACING: CPT

## 2025-04-07 PROCEDURE — 2500000002 HC RX 250 W HCPCS SELF ADMINISTERED DRUGS (ALT 637 FOR MEDICARE OP, ALT 636 FOR OP/ED): Mod: SE

## 2025-04-07 PROCEDURE — 2550000001 HC RX 255 CONTRASTS: Mod: SE

## 2025-04-07 PROCEDURE — 2500000005 HC RX 250 GENERAL PHARMACY W/O HCPCS: Mod: SE

## 2025-04-07 PROCEDURE — 71275 CT ANGIOGRAPHY CHEST: CPT

## 2025-04-07 PROCEDURE — 99285 EMERGENCY DEPT VISIT HI MDM: CPT | Mod: 25

## 2025-04-07 PROCEDURE — 71275 CT ANGIOGRAPHY CHEST: CPT | Mod: FOREIGN READ | Performed by: RADIOLOGY

## 2025-04-07 PROCEDURE — 2500000001 HC RX 250 WO HCPCS SELF ADMINISTERED DRUGS (ALT 637 FOR MEDICARE OP): Mod: SE

## 2025-04-07 PROCEDURE — 84484 ASSAY OF TROPONIN QUANT: CPT

## 2025-04-07 PROCEDURE — 85025 COMPLETE CBC W/AUTO DIFF WBC: CPT

## 2025-04-07 RX ORDER — ACETAMINOPHEN 325 MG/1
650 TABLET ORAL EVERY 6 HOURS PRN
Qty: 20 TABLET | Refills: 0 | Status: SHIPPED | OUTPATIENT
Start: 2025-04-07 | End: 2025-04-12

## 2025-04-07 RX ORDER — HYDROXYZINE HYDROCHLORIDE 25 MG/1
25 TABLET, FILM COATED ORAL 2 TIMES DAILY
Qty: 6 TABLET | Refills: 0 | Status: SHIPPED | OUTPATIENT
Start: 2025-04-07 | End: 2025-04-10

## 2025-04-07 RX ORDER — FAMOTIDINE 40 MG/1
40 TABLET, FILM COATED ORAL NIGHTLY PRN
Qty: 10 TABLET | Refills: 0 | Status: SHIPPED | OUTPATIENT
Start: 2025-04-07 | End: 2025-04-17

## 2025-04-07 RX ORDER — HYDROXYZINE HYDROCHLORIDE 25 MG/1
50 TABLET, FILM COATED ORAL ONCE
Status: COMPLETED | OUTPATIENT
Start: 2025-04-07 | End: 2025-04-07

## 2025-04-07 RX ORDER — ACETAMINOPHEN 325 MG/1
975 TABLET ORAL ONCE
Status: COMPLETED | OUTPATIENT
Start: 2025-04-07 | End: 2025-04-07

## 2025-04-07 RX ADMIN — LIDOCAINE HYDROCHLORIDE 10 ML: 20 SOLUTION ORAL; TOPICAL at 17:33

## 2025-04-07 RX ADMIN — ACETAMINOPHEN 975 MG: 325 TABLET ORAL at 17:33

## 2025-04-07 RX ADMIN — IOHEXOL 80 ML: 350 INJECTION, SOLUTION INTRAVENOUS at 18:35

## 2025-04-07 RX ADMIN — HYDROXYZINE HYDROCHLORIDE 50 MG: 25 TABLET, FILM COATED ORAL at 17:33

## 2025-04-07 ASSESSMENT — LIFESTYLE VARIABLES
TOTAL SCORE: 0
HAVE YOU EVER FELT YOU SHOULD CUT DOWN ON YOUR DRINKING: NO
EVER HAD A DRINK FIRST THING IN THE MORNING TO STEADY YOUR NERVES TO GET RID OF A HANGOVER: NO
HAVE PEOPLE ANNOYED YOU BY CRITICIZING YOUR DRINKING: NO
EVER FELT BAD OR GUILTY ABOUT YOUR DRINKING: NO

## 2025-04-07 ASSESSMENT — PAIN - FUNCTIONAL ASSESSMENT: PAIN_FUNCTIONAL_ASSESSMENT: 0-10

## 2025-04-07 NOTE — ED TRIAGE NOTES
Pt presents to the ED c/o recurrent SOB and asthma excerebration over the last 8 month . Pt states this most recently happen on his way here and he had to use his inhaler 15 times before it was better. Per pt he was supposed to go to a cardiologist due tachycardia and palpitation but he was unable to go due to lack of insurance. HR noted to be ST but resolved prior to getting an EKG.

## 2025-04-07 NOTE — ED PROVIDER NOTES
HPI   Chief Complaint   Patient presents with   • Shortness of Breath       Patient is a 24 year old male presenting to the ED with shortness of breath. He believes this to be from an asthma exacerbation. Around 1000 today, he began experiencing chest tightness and shortness of breath, for which he used his albuterol inhaler with temporary relief. He has used his inhaler about 15 times today. He has been worked up for these symptoms recently and was placed on Singulair and prednisone which he reports have not helped control his symptoms. He denies any fever, chills, cough, or cold symptoms.  Denies any hemoptysis.  Denies rashes or swelling or hormone therapy replacement.  Denies nausea or vomiting.  Denies changes in urination or bowel movement.              Patient History   Past Medical History:   Diagnosis Date   • Bilateral intraabdominal testes     Bilateral cryptorchidism   • Cough, unspecified 2016    Cough   • Lower abdominal pain, unspecified     Groin pain   • Personal history of (healed) traumatic fracture 2016    History of fracture of clavicle   • Personal history of other diseases of the digestive system 2014    History of gastroesophageal reflux (GERD)   •  , unspecified weeks of gestation ([facility])     Prematurity   • Testicular pain, unspecified 2016    Testicular pain   • Tinnitus, bilateral 2020    Tinnitus of both ears   • Unspecified fracture of unspecified metacarpal bone, initial encounter for closed fracture 2016    Fracture, metacarpal     Past Surgical History:   Procedure Laterality Date   • OTHER SURGICAL HISTORY  2014    Surgery Testis   • OTHER SURGICAL HISTORY  2014    Surgery Testis Orchiopexy, Inguinal Approach     No family history on file.  Social History     Tobacco Use   • Smoking status: Never   • Smokeless tobacco: Never   Vaping Use   • Vaping status: Never Used   Substance Use Topics   • Alcohol use: Never   •  Drug use: Never       Physical Exam   ED Triage Vitals [04/07/25 1622]   Temperature Heart Rate Respirations BP   36.7 °C (98.1 °F) 92 20 131/78      Pulse Ox Temp Source Heart Rate Source Patient Position   94 % Oral -- --      BP Location FiO2 (%)     -- --       Physical Exam  Vitals and nursing note reviewed.   Constitutional:       General: He is not in acute distress.     Appearance: He is well-developed.   HENT:      Head: Normocephalic and atraumatic.   Eyes:      Conjunctiva/sclera: Conjunctivae normal.   Cardiovascular:      Rate and Rhythm: Normal rate and regular rhythm.      Heart sounds: No murmur heard.  Pulmonary:      Effort: Pulmonary effort is normal. No respiratory distress.      Breath sounds: Normal breath sounds.   Abdominal:      Palpations: Abdomen is soft.      Tenderness: There is no abdominal tenderness.   Musculoskeletal:         General: No swelling.      Cervical back: Neck supple.   Skin:     General: Skin is warm and dry.      Capillary Refill: Capillary refill takes less than 2 seconds.   Neurological:      Mental Status: He is alert.   Psychiatric:         Mood and Affect: Mood normal.           ED Course & MDM   Diagnoses as of 04/07/25 1828   Asthma, unspecified asthma severity, unspecified whether complicated, unspecified whether persistent ([facility])   Anxiety                 No data recorded     Leatha Coma Scale Score: 15 (04/07/25 1623 : Marisabel Berg RN)                           Medical Decision Making  Signs reviewed, unremarkable at this time.  Patient is well-appearing and in no apparent distress.  Speaks full senses without difficulty.  Diagnostic testing performed.  The patient has been seen numerous times for similar complaints in the past.  He has had full workups including with Blood labs, viral swabs, and chest x-rays.  Last time he was here his D-dimer was slightly elevated in the 600s.  Years criteria used to rule out PE and CT scan was avoided.  At this  point patient has similar complaints without any wheezing or signs of asthma on exam.  Therefore a CT scan for PE was ordered.  Recent note from a physician that he saw on the 28th states that his meds were refilled and that the patient was encouraged to follow-up with both a primary care doctor and a pulmonologist, 2 things that he has got to do.  I emphasized this and we were able to place referral orders in the ED.  We discussed the importance of follow-up with specialists.  He is in agreement and states that he will follow-up.  Basic labs were also ordered today and he was given Tylenol, Atarax, BMX.  We did discuss the possibility of this being anxiety related.  States he does feel anxious sometimes.  Also possibility that this is related to GERD.  Twelve-lead EKG shows normal sinus rhythm with no evidence of ischemia and with normal axis.  Rate 83, NC interval 138, QRS 88, QTc 401. By the end of my shift the patient workup was still pending labs and imaging.  He remained calm and cooperative and in stable condition.  And electronically provider pending diagnostics and patient reevaluation.        Procedure  Procedures     Shan Rodriguez, AMANDA-MARY  04/07/25 9239

## 2025-04-07 NOTE — Clinical Note
Willian Newton was seen and treated in our emergency department on 4/7/2025.  He may return to work on 04/09/2025.       If you have any questions or concerns, please don't hesitate to call.      Shan Rodriguez, APRHERON-CNP

## 2025-04-08 LAB
ATRIAL RATE: 83 BPM
P AXIS: 70 DEGREES
P OFFSET: 199 MS
P ONSET: 151 MS
PR INTERVAL: 138 MS
Q ONSET: 220 MS
QRS COUNT: 14 BEATS
QRS DURATION: 88 MS
QT INTERVAL: 342 MS
QTC CALCULATION(BAZETT): 401 MS
QTC FREDERICIA: 381 MS
R AXIS: 84 DEGREES
T AXIS: 49 DEGREES
T OFFSET: 391 MS
VENTRICULAR RATE: 83 BPM

## 2025-04-08 NOTE — PROGRESS NOTES
Patient was handed off to me from the previous team. For full history, physical, and prior ED course, please see previous provider note prior to patient handoff. This is an addendum to the record.    Willian Glez  presented to Emergency Department  for   Chief Complaint   Patient presents with    Shortness of Breath   .     Medical work up included labs, diagnostic testing.   Diagnoses as of 04/07/25 2232   Asthma, unspecified asthma severity, unspecified whether complicated, unspecified whether persistent ([facility])   Anxiety       Labs:  Labs Reviewed   CBC WITH AUTO DIFFERENTIAL - Abnormal       Result Value    WBC 5.6      nRBC 0.0      RBC 5.09      Hemoglobin 12.8 (*)     Hematocrit 39.5 (*)     MCV 78 (*)     MCH 25.1 (*)     MCHC 32.4      RDW 13.1      Platelets 223      Neutrophils % 46.3      Immature Granulocytes %, Automated 0.0      Lymphocytes % 41.8      Monocytes % 8.6      Eosinophils % 2.9      Basophils % 0.4      Neutrophils Absolute 2.59      Immature Granulocytes Absolute, Automated 0.00      Lymphocytes Absolute 2.33      Monocytes Absolute 0.48      Eosinophils Absolute 0.16      Basophils Absolute 0.02     BASIC METABOLIC PANEL - Normal    Glucose 83      Sodium 139      Potassium 4.0      Chloride 101      Bicarbonate 27      Anion Gap 15      Urea Nitrogen 9      Creatinine 0.80      eGFR >90      Calcium 9.5     TROPONIN I, HIGH SENSITIVITY - Normal    Troponin I, High Sensitivity ([facility]) <3      Narrative:     Less than 99th percentile of normal range cutoff-  Female and children under 18 years old <35 ng/L; Male <54 ng/L: Negative  Repeat testing should be performed if clinically indicated.     Female and children under 18 years old  ng/L; Male  ng/L:  Consistent with possible cardiac damage and possible increased clinical   risk. Serial measurements may help to assess extent of myocardial damage.     >120 ng/L: Consistent with cardiac damage, increased clinical risk  and  myocardial infarction. Serial measurements may help assess extent of   myocardial damage.      NOTE: Children less than 1 year old may have higher baseline troponin   levels and results should be interpreted in conjunction with the overall   clinical context.    NOTE: Troponin I testing is performed using a different   testing methodology at [facility] than at other   [facility] hospitals. Direct result comparisons should only   be made within the same method.         Imaging:  CT angio chest for pulmonary embolism   Final Result   Mild motion artifact present. No pulmonary embolism identified.   Signed by Felipe Lopez MD              At time of sign out pending: CT PE, labs    What occured after transition of care: CT PE negative for PE. BMP unremarkable. CBC with anemia consistent with previous labs. Patient encouraged to take an iron supplement. Troponin WNL. Patient states symptoms have improved and will be discharged. Patient agreeable to follow up with specialists. Discharged in stable condition      Ann Kim PA-C

## 2025-04-17 ENCOUNTER — APPOINTMENT (OUTPATIENT)
Dept: RADIOLOGY | Facility: HOSPITAL | Age: 25
End: 2025-04-17
Payer: MEDICAID

## 2025-04-17 ENCOUNTER — HOSPITAL ENCOUNTER (EMERGENCY)
Facility: HOSPITAL | Age: 25
Discharge: HOME | End: 2025-04-17
Attending: EMERGENCY MEDICINE
Payer: MEDICAID

## 2025-04-17 ENCOUNTER — CLINICAL SUPPORT (OUTPATIENT)
Dept: EMERGENCY MEDICINE | Facility: HOSPITAL | Age: 25
End: 2025-04-17
Payer: MEDICAID

## 2025-04-17 VITALS
RESPIRATION RATE: 18 BRPM | WEIGHT: 150 LBS | SYSTOLIC BLOOD PRESSURE: 129 MMHG | BODY MASS INDEX: 24.99 KG/M2 | OXYGEN SATURATION: 98 % | HEIGHT: 65 IN | DIASTOLIC BLOOD PRESSURE: 81 MMHG | HEART RATE: 74 BPM | TEMPERATURE: 97.2 F

## 2025-04-17 DIAGNOSIS — F41.9 ANXIETY: Primary | ICD-10-CM

## 2025-04-17 DIAGNOSIS — J45.901 PERSISTENT ASTHMA WITH ACUTE EXACERBATION, UNSPECIFIED ASTHMA SEVERITY (HHS-HCC): ICD-10-CM

## 2025-04-17 PROCEDURE — 94640 AIRWAY INHALATION TREATMENT: CPT | Mod: 59

## 2025-04-17 PROCEDURE — 2500000001 HC RX 250 WO HCPCS SELF ADMINISTERED DRUGS (ALT 637 FOR MEDICARE OP): Mod: SE

## 2025-04-17 PROCEDURE — 99285 EMERGENCY DEPT VISIT HI MDM: CPT | Performed by: EMERGENCY MEDICINE

## 2025-04-17 PROCEDURE — 2500000002 HC RX 250 W HCPCS SELF ADMINISTERED DRUGS (ALT 637 FOR MEDICARE OP, ALT 636 FOR OP/ED): Mod: JZ,SE

## 2025-04-17 PROCEDURE — 2500000001 HC RX 250 WO HCPCS SELF ADMINISTERED DRUGS (ALT 637 FOR MEDICARE OP): Mod: SE | Performed by: EMERGENCY MEDICINE

## 2025-04-17 PROCEDURE — 2500000004 HC RX 250 GENERAL PHARMACY W/ HCPCS (ALT 636 FOR OP/ED): Mod: SE

## 2025-04-17 PROCEDURE — 93005 ELECTROCARDIOGRAM TRACING: CPT

## 2025-04-17 RX ORDER — IBUPROFEN 600 MG/1
600 TABLET ORAL ONCE
Status: COMPLETED | OUTPATIENT
Start: 2025-04-17 | End: 2025-04-17

## 2025-04-17 RX ORDER — ALBUTEROL SULFATE 90 UG/1
2 INHALANT RESPIRATORY (INHALATION) EVERY 4 HOURS PRN
Qty: 18 G | Refills: 3 | Status: SHIPPED | OUTPATIENT
Start: 2025-04-17 | End: 2025-05-17

## 2025-04-17 RX ORDER — HYDROXYZINE HYDROCHLORIDE 25 MG/1
25 TABLET, FILM COATED ORAL ONCE
Status: COMPLETED | OUTPATIENT
Start: 2025-04-17 | End: 2025-04-17

## 2025-04-17 RX ORDER — IPRATROPIUM BROMIDE AND ALBUTEROL SULFATE 2.5; .5 MG/3ML; MG/3ML
3 SOLUTION RESPIRATORY (INHALATION)
Status: COMPLETED | OUTPATIENT
Start: 2025-04-17 | End: 2025-04-17

## 2025-04-17 RX ORDER — MIRTAZAPINE 7.5 MG/1
7.5 TABLET, FILM COATED ORAL NIGHTLY PRN
Qty: 30 TABLET | Refills: 0 | Status: SHIPPED | OUTPATIENT
Start: 2025-04-17 | End: 2025-05-17

## 2025-04-17 RX ORDER — PREDNISONE 20 MG/1
40 TABLET ORAL DAILY
Qty: 10 TABLET | Refills: 0 | Status: SHIPPED | OUTPATIENT
Start: 2025-04-17 | End: 2025-04-22

## 2025-04-17 RX ORDER — PREDNISONE 20 MG/1
40 TABLET ORAL ONCE
Status: COMPLETED | OUTPATIENT
Start: 2025-04-17 | End: 2025-04-17

## 2025-04-17 RX ORDER — HYDROXYZINE PAMOATE 50 MG/1
50 CAPSULE ORAL 3 TIMES DAILY PRN
Qty: 30 CAPSULE | Refills: 3 | Status: SHIPPED | OUTPATIENT
Start: 2025-04-17 | End: 2025-05-27

## 2025-04-17 RX ORDER — ACETAMINOPHEN 325 MG/1
975 TABLET ORAL ONCE
Status: COMPLETED | OUTPATIENT
Start: 2025-04-17 | End: 2025-04-17

## 2025-04-17 RX ADMIN — HYDROXYZINE HYDROCHLORIDE 25 MG: 25 TABLET ORAL at 02:53

## 2025-04-17 RX ADMIN — IBUPROFEN 600 MG: 600 TABLET ORAL at 01:55

## 2025-04-17 RX ADMIN — IPRATROPIUM BROMIDE AND ALBUTEROL SULFATE 3 ML: .5; 3 SOLUTION RESPIRATORY (INHALATION) at 02:51

## 2025-04-17 RX ADMIN — PREDNISONE 40 MG: 20 TABLET ORAL at 01:56

## 2025-04-17 RX ADMIN — IPRATROPIUM BROMIDE AND ALBUTEROL SULFATE 3 ML: .5; 3 SOLUTION RESPIRATORY (INHALATION) at 02:25

## 2025-04-17 RX ADMIN — ACETAMINOPHEN 975 MG: 325 TABLET, FILM COATED ORAL at 01:55

## 2025-04-17 RX ADMIN — IPRATROPIUM BROMIDE AND ALBUTEROL SULFATE 3 ML: .5; 3 SOLUTION RESPIRATORY (INHALATION) at 01:55

## 2025-04-17 ASSESSMENT — PAIN DESCRIPTION - PAIN TYPE: TYPE: ACUTE PAIN

## 2025-04-17 ASSESSMENT — LIFESTYLE VARIABLES
HAVE PEOPLE ANNOYED YOU BY CRITICIZING YOUR DRINKING: NO
HAVE YOU EVER FELT YOU SHOULD CUT DOWN ON YOUR DRINKING: NO
EVER FELT BAD OR GUILTY ABOUT YOUR DRINKING: NO
TOTAL SCORE: 0
EVER HAD A DRINK FIRST THING IN THE MORNING TO STEADY YOUR NERVES TO GET RID OF A HANGOVER: NO

## 2025-04-17 ASSESSMENT — PAIN - FUNCTIONAL ASSESSMENT: PAIN_FUNCTIONAL_ASSESSMENT: 0-10

## 2025-04-17 ASSESSMENT — PAIN DESCRIPTION - LOCATION: LOCATION: CHEST

## 2025-04-17 ASSESSMENT — PAIN SCALES - GENERAL: PAINLEVEL_OUTOF10: 9

## 2025-04-17 NOTE — DISCHARGE INSTRUCTIONS
Please continue using the albuterol as prescribed with spacer.  We would recommend following up with your PCP for possible anxiety and start the hydroxyzine and mirtazapine as prescribed.  Please continue using your Dulera and montelukast.

## 2025-04-17 NOTE — ED TRIAGE NOTES
Pt to ED c/o short of breath &  mid sternal chest pain after he left work and starting walking home, describes it as a tight, heaviness. Pt states he used his inhaler with no relief. Pt has PMH of asthma

## 2025-04-17 NOTE — ED PROVIDER NOTES
HPI   Chief Complaint   Patient presents with    Shortness of Breath       Patient is a 24-year-old male with past medical history of asthma presenting with concern for shortness of breath and midsternal chest tightness.  Reports current symptoms started while he was walking home from  his job at the [employer] while outside.  Describes tightness in his chest and trouble breathing.  Reports he took approximately 10 puffs of his albuterol with some improvement in symptoms but not resolution.  Reports he was generally doing well before symptom onset and denies recent illness, fevers, chills, cough, congestion.  Reports he has had frequent asthma flareups with similar symptoms recently and does report recent hospitalization for workup of atypical chest pain without clear cause identified.  Reports he is on Dulera 2 puffs twice a day, montelukast as well as the as needed albuterol inhaler.  Does report he finished a course of steroids last week.  Reports pressure up several days ago but no recent exertion, weight lifting or traumatic injury.            Patient History   Medical History[1]  Surgical History[2]  Family History[3]  Social History[4]    Physical Exam   ED Triage Vitals [04/17/25 0122]   Temperature Heart Rate Respirations BP   36.2 °C (97.2 °F) 74 18 129/81      Pulse Ox Temp src Heart Rate Source Patient Position   98 % -- -- --      BP Location FiO2 (%)     -- --       Physical Exam  Constitutional:       Appearance: Normal appearance.   HENT:      Head: Normocephalic and atraumatic.   Eyes:      Extraocular Movements: Extraocular movements intact.   Cardiovascular:      Rate and Rhythm: Normal rate.   Pulmonary:      Comments: Satting well on room air.  No appreciable wheeze on auscultation.  Good aeration all fields with no focal findings.    Musculoskeletal:         General: Normal range of motion.      Cervical back: Normal range of motion.      Comments: Tenderness to palpation over midsternal  chest as well as both left and right of midline overlying pectorals.   Skin:     General: Skin is warm and dry.   Neurological:      General: No focal deficit present.      Mental Status: He is alert and oriented to person, place, and time.   Psychiatric:         Mood and Affect: Mood normal.         Behavior: Behavior normal.           ED Course & MDM   Diagnoses as of 04/17/25 1040   Anxiety   Persistent asthma with acute exacerbation, unspecified asthma severity ([facility])                 No data recorded                                 Medical Decision Making  EKG shows a normal rate of 84bpm, normal sinus rhythm, normal axis,  ms, QRS 86 ms, QTc 408 ms. Normal ST and T wave pattern with no evidence of acute ischemia or other acute findings.    Patient is a 24-year-old male with past medical history of asthma presenting with concern for shortness of breath and midsternal chest tightness.  Description of symptoms concerning for asthma exacerbation although minimal wheeze, no hypoxia or respiratory distress on examination here.  There is some concern for patient's frequent exacerbations but already on montelukast and dulera as controller medication.  Risks and benefits of steroids discussed with patient given concern for side effects given patient's frequent steroid use with patient decision that he would like to try dose of steroids to prevent recurrence of symptoms at home.  Otherwise suspicion for anxiety component to symptoms more than alternative pathology.  Chest pain possibly consistent with musculoskeletal cause given reproducibility with palpation.  Otherwise young, minimal cardiac risk factors.  Does have recurrence of similar symptoms in the past and worked up for atypical chest pain in the past but no clear etiology identified at the time.  Pretest probability very low in 24-year-old with no vascular risk factors and blood work felt to be of low diagnostic yield at this time.  Given significant  evidence of anxiety component to symptoms, prescribed hydroxyzine and mirtazapine.  Advised on close follow-up with PCP for further management of this as well as his asthma.  Return precautions, appropriate follow-up discussed with patient and patient discharged home.    Patient seen and discussed with Dr. Tez Colon MD, PhD  Emergency Medicine PGY3          Procedure  Procedures       [1]   Past Medical History:  Diagnosis Date    Bilateral intraabdominal testes     Bilateral cryptorchidism    Cough, unspecified 2016    Cough    Lower abdominal pain, unspecified     Groin pain    Personal history of (healed) traumatic fracture 2016    History of fracture of clavicle    Personal history of other diseases of the digestive system 2014    History of gastroesophageal reflux (GERD)     , unspecified weeks of gestation ([facility])     Prematurity    Testicular pain, unspecified 2016    Testicular pain    Tinnitus, bilateral 2020    Tinnitus of both ears    Unspecified fracture of unspecified metacarpal bone, initial encounter for closed fracture 2016    Fracture, metacarpal   [2]   Past Surgical History:  Procedure Laterality Date    OTHER SURGICAL HISTORY  2014    Surgery Testis    OTHER SURGICAL HISTORY  2014    Surgery Testis Orchiopexy, Inguinal Approach   [3] No family history on file.  [4]   Social History  Tobacco Use    Smoking status: Never    Smokeless tobacco: Never   Vaping Use    Vaping status: Never Used   Substance Use Topics    Alcohol use: Never    Drug use: Never        Dilip Colon MD  Resident  25 [phone]

## 2025-04-19 DIAGNOSIS — J45.901 MILD ASTHMA WITH EXACERBATION, UNSPECIFIED WHETHER PERSISTENT (HHS-HCC): ICD-10-CM

## 2025-04-21 RX ORDER — MONTELUKAST SODIUM 10 MG/1
10 TABLET ORAL NIGHTLY
Qty: 90 TABLET | Refills: 1 | OUTPATIENT
Start: 2025-04-21 | End: 2026-04-21

## 2025-05-06 ENCOUNTER — APPOINTMENT (OUTPATIENT)
Dept: PRIMARY CARE | Facility: CLINIC | Age: 25
End: 2025-05-06
Payer: MEDICAID

## 2025-05-18 ENCOUNTER — DOCUMENTATION (OUTPATIENT)
Dept: EMERGENCY MEDICINE | Facility: HOSPITAL | Age: 25
End: 2025-05-18
Payer: MEDICAID

## 2025-05-18 ENCOUNTER — HOSPITAL ENCOUNTER (EMERGENCY)
Facility: HOSPITAL | Age: 25
Discharge: HOME | End: 2025-05-18
Attending: EMERGENCY MEDICINE
Payer: MEDICAID

## 2025-05-18 VITALS
HEIGHT: 65 IN | OXYGEN SATURATION: 98 % | BODY MASS INDEX: 24.83 KG/M2 | HEART RATE: 91 BPM | DIASTOLIC BLOOD PRESSURE: 80 MMHG | TEMPERATURE: 97.7 F | WEIGHT: 149 LBS | SYSTOLIC BLOOD PRESSURE: 132 MMHG | RESPIRATION RATE: 18 BRPM

## 2025-05-18 DIAGNOSIS — Z11.3 SCREENING EXAMINATION FOR STD (SEXUALLY TRANSMITTED DISEASE): ICD-10-CM

## 2025-05-18 DIAGNOSIS — L24.81 IRRITANT CONTACT DERMATITIS DUE TO METALS: Primary | ICD-10-CM

## 2025-05-18 LAB
HAV IGM SER QL: NONREACTIVE
HBV CORE IGM SER QL: NONREACTIVE
HBV SURFACE AG SERPL QL IA: NONREACTIVE
HCV AB SER QL: NONREACTIVE
HIV 1+2 AB+HIV1 P24 AG SERPL QL IA: NONREACTIVE
T VAGINALIS RRNA SPEC QL NAA+PROBE: NEGATIVE
TREPONEMA PALLIDUM IGG+IGM AB [PRESENCE] IN SERUM OR PLASMA BY IMMUNOASSAY: NONREACTIVE

## 2025-05-18 PROCEDURE — 80074 ACUTE HEPATITIS PANEL: CPT

## 2025-05-18 PROCEDURE — 99284 EMERGENCY DEPT VISIT MOD MDM: CPT | Performed by: EMERGENCY MEDICINE

## 2025-05-18 PROCEDURE — 86780 TREPONEMA PALLIDUM: CPT

## 2025-05-18 PROCEDURE — 87491 CHLMYD TRACH DNA AMP PROBE: CPT

## 2025-05-18 PROCEDURE — 99283 EMERGENCY DEPT VISIT LOW MDM: CPT | Performed by: EMERGENCY MEDICINE

## 2025-05-18 PROCEDURE — 87661 TRICHOMONAS VAGINALIS AMPLIF: CPT

## 2025-05-18 PROCEDURE — 87389 HIV-1 AG W/HIV-1&-2 AB AG IA: CPT

## 2025-05-18 PROCEDURE — 36415 COLL VENOUS BLD VENIPUNCTURE: CPT

## 2025-05-18 PROCEDURE — 86695 HERPES SIMPLEX TYPE 1 TEST: CPT

## 2025-05-18 NOTE — ED PROVIDER NOTES
History of Present Illness     History provided by: Patient  Limitations to History: None  External Records Reviewed with Brief Summary: None    HPI:  Willian Glez is a 24 y.o. male with no reported past medical history presents today for rash.  Patient states that he recently got a new belt, has had a rash on his lower abdomen for roughly 1 week.  He endorses some redness irritation and swelling.  Addition, the patient is concerned that this may be an STD, would like tested for multiple STDs, including herpes.  He denies any known contacts with people who are known positive, denies any penile discharge, dysuria, testicular pain.  He denies any lower abdominal pain nausea vomiting diarrhea.  Denies any chest pain shortness breath, numbness weakness tingling his arms or legs.  Denies any rashes on his palms or soles.  Denies any fevers, changes in vision or hearing.    Physical Exam   Triage vitals:  T 36.5 °C (97.7 °F)  HR 91  /80  RR 18  O2 98 % None (Room air)    Physical Exam  Constitutional:       General: He is not in acute distress.     Appearance: Normal appearance. He is not ill-appearing or diaphoretic.   HENT:      Head: Normocephalic and atraumatic.      Mouth/Throat:      Mouth: Mucous membranes are moist.      Pharynx: No oropharyngeal exudate or posterior oropharyngeal erythema.   Eyes:      General: No scleral icterus.     Extraocular Movements: Extraocular movements intact.      Pupils: Pupils are equal, round, and reactive to light.   Cardiovascular:      Rate and Rhythm: Normal rate and regular rhythm.      Pulses: Normal pulses.      Heart sounds: Normal heart sounds. No murmur heard.     No gallop.   Pulmonary:      Effort: Pulmonary effort is normal. No respiratory distress.      Breath sounds: Normal breath sounds. No stridor. No wheezing, rhonchi or rales.   Abdominal:      General: Bowel sounds are normal. There is no distension.      Palpations: Abdomen is soft. There is no  mass.      Tenderness: There is no abdominal tenderness.      Hernia: No hernia is present.   Genitourinary:     Penis: Normal.       Testes: Normal.      Comments: Small follicle with verdin at base of shaft of penis roughly 10 o'clock position  Musculoskeletal:         General: No swelling, deformity or signs of injury. Normal range of motion.      Cervical back: Normal range of motion and neck supple. No tenderness.   Skin:     General: Skin is warm.      Capillary Refill: Capillary refill takes less than 2 seconds.      Findings: No lesion.      Comments: Pink to red maculopapular rash just inferior to the umbilicus without any evidence of blistering   Neurological:      General: No focal deficit present.      Mental Status: He is alert. Mental status is at baseline.   Psychiatric:         Mood and Affect: Mood normal.         Behavior: Behavior normal.          Medical Decision Making & ED Course   Medical Decision Makin y.o. male with no reported past medical history presents today for STD screening and rash.  Patient's rash is most consistent with contact dermatitis secondary to his belt buckle.  He does have a known nickel allergy, which is likely the cause.  I did recommend that he get a new belt.  Additionally, we will test the patient for HIV hepatitis gonorrhea chlamydia as well as HSV.  I did discuss with him that HSV Ig only suggest that you have been exposed, does not necessarily suggest that his current rash is secondary to this.  Patient is comfortable with the idea that the rashes due to contact dermatitis, but still request HSV Ig.  Given this, we will send labs.  All questions were answered prior to discharge, return precaution provided.  ----      Differential diagnoses considered include but are not limited to: IgE mediated contact dermatitis, type IV hypersensitivity to metals, HSV dermatitis, folliculitis, cellulitis     Social Determinants of Health which Significantly Impact Care:  None identified     EKG Independent Interpretation: EKG not obtained    Independent Result Review and Interpretation: Relevant laboratory and radiographic results were reviewed and independently interpreted by myself.  As necessary, they are commented on in the ED Course.    Chronic conditions affecting the patient's care: As documented above in [facility]    The patient was discussed with the following consultants/services: None    Care Considerations: As documented above in [facility]    ED Course:  Diagnoses as of 05/18/25 1840   Irritant contact dermatitis due to metals   Screening examination for STD (sexually transmitted disease)     Disposition   As a result of the work-up, the patient was discharged home.  he was informed of his diagnosis and instructed to come back with any concerns or worsening of condition.  he and was agreeable to the plan as discussed above.  he was given the opportunity to ask questions.  All of the patient's questions were answered.    Procedures   Procedures    Patient seen and discussed with ED attending physician.    Apolinar Moraes MD  Emergency Medicine       Apolinar Moraes MD  Resident  05/18/25 3849

## 2025-05-18 NOTE — RESEARCH NOTES
11:28 AM    Test:   Discussed HIV/HCV testing with the patient in the ED.   Patient received ED HIV/HCV testing: HIV test today.    Test Result:  HIV negative  HCV negative    Result notification:  Patient was notified of their test results on [5/18/25] via In-person vs phone: Phone after verifying 3 patient identifiers    Follow-up plan:   If patient is positive for HIV and/or HCV, referral to outpatient treatment was attempted.     Referral successful: No, Describe negative testing  If Yes:   Patient was referred to NA  Patient has appointment at [clinic name] on [Date]    Signed  MENDEZ Perez  HIV/HCV FOCUS Program  Department of Emergency Medicine - [facility]  Please contact me via email or Epic Chat with questions

## 2025-05-18 NOTE — RESEARCH NOTES
11:44 AM    Test:   Discussed HIV/HCV testing with the patient in the ED.   Patient received ED HIV/HCV testing: HIV test today.    Test Result:  HIV negative  HCV negative    Result notification:  Patient was notified of their test results on [Date] via In-person vs phone: Phone after verifying 3 patient identifiers    Follow-up plan:   If patient is positive for HIV and/or HCV, referral to outpatient treatment was attempted.     Referral successful: NO FOLLOW UP NEEDED NEGATIVE TESTS  If Yes:   Patient was referred to   Patient has appointment at [clinic name] on [Date]    Signed  MENDEZ Perez  HIV/HCV FOCUS Program  Department of Emergency Medicine - Research  Please contact me via email or Epic Chat with questions

## 2025-05-18 NOTE — DISCHARGE INSTRUCTIONS
You were seen today for a rash on your abdomen.  You appear to have contact dermatitis.  This is likely from your belt buckle.  I would recommend getting a new belt as this is likely irritating your skin.  Additionally, you can attempt to wear longer shirts, however, if you do come into contact with your belt it would likely continue to irritate your skin.  Additionally, if any of your results are positive, we will call you notify you.  If they are not, we may not call you.  The results will show up in your MyChart, so if you are signed up for this, you can view them as they come back.

## 2025-05-18 NOTE — ED TRIAGE NOTES
Pt presents to the ED for a rash on his stomach that started 2 weeks. He says it is red and sometimes itchy.

## 2025-05-19 LAB
C TRACH RRNA SPEC QL NAA+PROBE: NEGATIVE
N GONORRHOEA DNA SPEC QL PROBE+SIG AMP: NEGATIVE

## 2025-05-20 ENCOUNTER — HOSPITAL ENCOUNTER (EMERGENCY)
Facility: HOSPITAL | Age: 25
Discharge: HOME | End: 2025-05-20
Attending: STUDENT IN AN ORGANIZED HEALTH CARE EDUCATION/TRAINING PROGRAM
Payer: MEDICAID

## 2025-05-20 ENCOUNTER — TELEPHONE (OUTPATIENT)
Dept: PHARMACY | Facility: HOSPITAL | Age: 25
End: 2025-05-20
Payer: MEDICAID

## 2025-05-20 VITALS
RESPIRATION RATE: 16 BRPM | SYSTOLIC BLOOD PRESSURE: 128 MMHG | OXYGEN SATURATION: 96 % | HEART RATE: 95 BPM | DIASTOLIC BLOOD PRESSURE: 63 MMHG | TEMPERATURE: 98.1 F

## 2025-05-20 DIAGNOSIS — B00.9 HERPES SIMPLEX VIRUS (HSV) INFECTION: Primary | ICD-10-CM

## 2025-05-20 DIAGNOSIS — L25.9 CONTACT DERMATITIS, UNSPECIFIED CONTACT DERMATITIS TYPE, UNSPECIFIED TRIGGER: Primary | ICD-10-CM

## 2025-05-20 LAB
HERPES SIMPLEX VIRUS 1 IGG: 1.7 INDEX
HERPES SIMPLEX VIRUS 2 IGG: <0.2 INDEX

## 2025-05-20 PROCEDURE — 99283 EMERGENCY DEPT VISIT LOW MDM: CPT | Performed by: STUDENT IN AN ORGANIZED HEALTH CARE EDUCATION/TRAINING PROGRAM

## 2025-05-20 PROCEDURE — 99282 EMERGENCY DEPT VISIT SF MDM: CPT | Performed by: STUDENT IN AN ORGANIZED HEALTH CARE EDUCATION/TRAINING PROGRAM

## 2025-05-20 RX ORDER — ACYCLOVIR 400 MG/1
400 TABLET ORAL 3 TIMES DAILY
Qty: 21 TABLET | Refills: 0 | Status: SHIPPED | OUTPATIENT
Start: 2025-05-20 | End: 2025-05-27

## 2025-05-20 RX ORDER — HYDROCORTISONE 1 %
1 CREAM (GRAM) TOPICAL 2 TIMES DAILY
Qty: 6 G | Refills: 0 | Status: SHIPPED | OUTPATIENT
Start: 2025-05-20 | End: 2025-06-19

## 2025-05-20 ASSESSMENT — PAIN - FUNCTIONAL ASSESSMENT: PAIN_FUNCTIONAL_ASSESSMENT: 0-10

## 2025-05-20 ASSESSMENT — PAIN SCALES - GENERAL: PAINLEVEL_OUTOF10: 0 - NO PAIN

## 2025-05-20 NOTE — ED TRIAGE NOTES
Pt to ED for STD testing. Pt states that he was recently tested at [facility] main for STDs. 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 No symptoms currently. Pt states that initial Rash he was seen for has decreased on its own.

## 2025-05-20 NOTE — Clinical Note
Willian Glez was seen and treated in our emergency department on 5/20/2025.  He may return to work on 05/21/2025.       If you have any questions or concerns, please don't hesitate to call.      Ruddy Florian MD

## 2025-05-20 NOTE — PROGRESS NOTES
EDPD Note: Rapid Result Review    I reviewed Willian Glez 's chart regarding a positive HSV-1 genital culture/result that was taken during their recent emergency room visit. The patient was not told about these results prior to leaving the emergency department. Therefore, patient was contacted and given appropriate education.     Patient called EDPD regarding MyChart results. 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 Recommended antiviral for treatment and reduction in duration of outbreak however call was disconnected prior to completion of counseling. Recommend Acyclovir 400mg TID x7    Component  Ref Range & Units 2 d ago 6 mo ago   HSV 1, IgG  <0.9 INDEX 1.7 High  0.7 CM   Comment: NEGATIVE <0.9  EQUIVOCAL >=0.90 <=1.10  POSITIVE >1.10   HSV 2, IgG  <0.9 INDEX <0.2 <0.2 CM   Comment: NEGATIVE <0.9  EQUIVOCAL >=0.90 <=1.10  POSITIVE >1.10   Resulting Agency [facility]              Narrative  Performed by: [facility]  POTENTIAL FOR CROSS-REACTIVITY BETWEEN  HSV I AND HSV II EXISTS.   Specimen Collected: 05/18/25 02:44 Last Resulted: 05/20/25 11:08       Admission on 05/18/2025, Discharged on 05/18/2025   Component Date Value Ref Range Status    Syphilis Total Ab 05/18/2025 Nonreactive  Nonreactive Final    No significant level of Treponema pallidum antibody detected.   Repeat testing in 2 to 4 weeks may be considered if early   infection or incubating syphilis infection is suspected.    HIV 1/2 Antigen/Antibody Screen wi* 05/18/2025 Nonreactive  Nonreactive Final    Neisseria gonorrhea,Amplified 05/18/2025 Negative  Negative Final    Chlamydia trachomatis, Amplified 05/18/2025 Negative  Negative Final    Hepatitis B Surface AG 05/18/2025 Nonreactive  Nonreactive Final    Biotin interference may cause falsely decreased results. Patients taking a Biotin dose of up to 5 mg/day should refrain from taking Biotin for 24 hours before sample collection. Providers may  contact their local laboratory for  further information.    Hepatitis A  AB- IgM 05/18/2025 Nonreactive  Nonreactive Final    Biotin interference may cause falsely decreased results. Patients taking a Biotin dose of up to 5 mg/day should refrain from taking Biotin for 24 hours before sample collection. Providers may contact their local laboratory  for further information.        Hepatitis B Core AB; IgM 05/18/2025 Nonreactive  Nonreactive Final    Results from patients taking biotin supplements or receiving high-dose biotin therapy should be interpreted with caution due to possible interference with this test. Providers may contact their local laboratory for further information.        Hepatitis C AB 05/18/2025 Nonreactive  Nonreactive Final    Results from patients taking biotin supplements or receiving high-dose biotin therapy should be interpreted with caution due to possible interference with this test. Providers may contact their local laboratory for further information.    Trichomonas Vaginalis 05/18/2025 Negative  Negative Final    HSV 1, IgG 05/18/2025 1.7 (H)  <0.9 INDEX Final    NEGATIVE <0.9  EQUIVOCAL >=0.90 <=1.10  POSITIVE >1.10    HSV 2, IgG 05/18/2025 <0.2  <0.9 INDEX Final    NEGATIVE <0.9  EQUIVOCAL >=0.90 <=1.10  POSITIVE >1.10       No further follow up needed from EDPD Team.     If there are any other questions for the ED Post-Discharge Culture Follow Up Team, please contact [phone]. Fax: [phone].    Dahlia Mehta, AlyxD

## 2025-05-20 NOTE — PROGRESS NOTES
EDPD Note: Initiation     Contacted Willian ALBERTO Glez regarding a positive HSV-1 genital culture/result that was taken during their recent emergency room visit. I completed education with patient. The patient is not being treated appropriately.    Patient called back. Counseled patient on transmission, risk of reactivation, MOA of acyclovir, prevention of transmission. Counseled patient on living with HSV, and protected sex, as well as discussion with new sex partners. Answered all patient questions and concerns. Advised patient to finish full course of antibiotic and follow up with PCP or urgent care if symptoms do not completely resolve.      The following prescription was sent to the patient's preferred pharmacy. No further follow up needed from EDPD Team.     Drug: Acyclovir 400mg  Sig: TID x7  Qty: 21  Days Supply: 7    Component  Ref Range & Units 2 d ago 6 mo ago   HSV 1, IgG  <0.9 INDEX 1.7 High  0.7 CM   Comment: NEGATIVE <0.9  EQUIVOCAL >=0.90 <=1.10  POSITIVE >1.10   HSV 2, IgG  <0.9 INDEX <0.2 <0.2 CM   Comment: NEGATIVE <0.9  EQUIVOCAL >=0.90 <=1.10  POSITIVE >1.10   Resulting Agency [facility]              Narrative  Performed by: [facility]  POTENTIAL FOR CROSS-REACTIVITY BETWEEN  HSV I AND HSV II EXISTS.   Specimen Collected: 05/18/25 02:44 Last Resulted: 05/20/25 11:08             If there are any other questions for the ED Post-Discharge Culture Follow Up Team, please contact [phone]. Fax: [phone].    Dahlia Mehta, PharmD

## 2025-05-21 ENCOUNTER — HOSPITAL ENCOUNTER (EMERGENCY)
Facility: HOSPITAL | Age: 25
Discharge: HOME | End: 2025-05-21
Attending: EMERGENCY MEDICINE
Payer: MEDICAID

## 2025-05-21 ENCOUNTER — PATIENT OUTREACH (OUTPATIENT)
Dept: CARE COORDINATION | Facility: CLINIC | Age: 25
End: 2025-05-21
Payer: MEDICAID

## 2025-05-21 VITALS
WEIGHT: 155 LBS | TEMPERATURE: 97.7 F | HEART RATE: 99 BPM | DIASTOLIC BLOOD PRESSURE: 75 MMHG | OXYGEN SATURATION: 97 % | HEIGHT: 65 IN | RESPIRATION RATE: 18 BRPM | BODY MASS INDEX: 25.83 KG/M2 | SYSTOLIC BLOOD PRESSURE: 117 MMHG

## 2025-05-21 DIAGNOSIS — Z01.89 ENCOUNTER FOR ROUTINE LABORATORY TESTING: Primary | ICD-10-CM

## 2025-05-21 PROCEDURE — 99283 EMERGENCY DEPT VISIT LOW MDM: CPT | Performed by: EMERGENCY MEDICINE

## 2025-05-21 PROCEDURE — 87529 HSV DNA AMP PROBE: CPT

## 2025-05-21 PROCEDURE — 99283 EMERGENCY DEPT VISIT LOW MDM: CPT

## 2025-05-21 PROCEDURE — 99281 EMR DPT VST MAYX REQ PHY/QHP: CPT | Performed by: EMERGENCY MEDICINE

## 2025-05-21 ASSESSMENT — PAIN SCALES - GENERAL: PAINLEVEL_OUTOF10: 0 - NO PAIN

## 2025-05-21 NOTE — PROGRESS NOTES
Outreach call to patient to support a smooth transition of care from recent admission.  Spoke with patient, reviewed discharge medications, discharge instructions, assessed social needs, and provided education on importance of follow-up appointment with provider.     Provided patient with contact information for [facility] to get established with a PCP.    No additional follow up needed at this time.    Leigh Macedo RN, Care Manager  [facility], [facility] Care  [phone]

## 2025-05-21 NOTE — Clinical Note
Willian Glez was seen and treated in our emergency department on 5/21/2025.  He may return to work on 05/22/2025.       If you have any questions or concerns, please don't hesitate to call.      Ann Corado MD

## 2025-05-21 NOTE — ED TRIAGE NOTES
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 No symptoms currently. Pt states that initial Rash he was seen for has decreased on its own.

## 2025-05-21 NOTE — ED PROVIDER NOTES
Emergency Department Provider Note        History of Present Illness     History provided by: Patient  Limitations to History: None  External Records Reviewed with Brief Summary: Recent ED visit from 5/18/2025 with visit for lower abdominal and penile rash, tested positive for HSV, appropriate prescriptions provided and post discharge follow-up    HPI:  Willian Glez is a 24 y.o. male with recent positive HSV IgG who presents to ED requesting HSV testing.  Patient states he was seen on 5/18 and STD testing was sent, came back positive for HSV 1.  His partner was tested and tested negative, reports no known sores/rashes.  Since his partner is negative and he is positive, and because his abdominal rash is improving without antivirals, patient is concerned that his test may have been a false positive.  He reports a sore at the right gum, resolution of his abdominal rash without antivirals.  Reports no genital pain/itching, drainage, or rash.  Reports no fever/chills at home, no nausea/vomiting, or other systemic symptoms.      Physical Exam   Triage vitals:  T 36.5 °C (97.7 °F)  HR 99  /75  RR 18  O2 97 % None (Room air)    Physical exam:   Triage vitals reviewed.  Constitutional: Well developed adult in no acute distress, non toxic in appearance  Head: Normocephalic, atraumatic  Skin: Intact. No rashes or lesions.  Eyes: No conjunctival injection, scleral icterus, or drainage.  Mouth: No oropharyngeal edema/erythema. Small white ulcer to R lower gumline without fluctuance, no associated tooth pain/tenderness.  Cardiac: Normal rate, regular rhythm.  Pulmonary: Breath sounds clear to auscultation bilaterally. Normal work of breathing without signs of respiratory distress.  Abdomen: Soft, nontender, nondistended.  Some hyperpigmentation horizontally along the lower abdomen without vesicular or pustular rash  : Performed with chaperone.  Patient has small, singular vesicular versus pustular lesion at the  base of the penis at approximately 10 o'clock position.  Extremities: No gross deformities. Moving all extremities spontaneously without difficulty.   Neuro: Awake and alert. Face is symmetric.  Speech is clear.     Medical Decision Making & ED Course   Medical Decision Makin y.o. male presenting to ED requesting confirmatory testing for HSV.  On arrival he was afebrile and hemodynamically stable, saturating well on room air.  His physical exam was remarkable for a small abscess ulcer in the lower gumline as well as a single vesicular versus pustular lesion to the base of the penis.  We discussed at length the utility of HSV confirmatory testing.  Patient was concerned that his test was a false positive because his partner tested negative.  We discussed that it is possible for her partner to be HSV negative with the patient being HSV positive and that some HSV viruses can be transmitted through cold sores or oral lesions rather than genital lesions.    HSV PCR swabs sent from oral lesion as well as penile lesion at patient's request.  Both of these lesions are nonpainful, no itching/tingling so I have low suspicion for acute HSV eruption.  Discussed with the patient that his results will be available in a few days to week.  He was advised that he will receive a telephone call if results are positive.  Patient also does have MyChart and can check results there as well.  Reviewed return precautions, all questions answered.  Patient discharged home in stable condition..   ----    Differential diagnoses considered include but are not limited to: HSV infection, aphthous ulcer     Social Determinants of Health which Significantly Impact Care: None identified     Chronic conditions affecting the patient's care: As documented above in [facility]    The patient was discussed with the following consultants/services: None    Care Considerations: As documented above in [facility]    ED Course:  Diagnoses as of 25   Encounter  for routine laboratory testing     Disposition   As a result of the work-up, the patient was discharged home.  he was informed of his diagnosis and instructed to come back with any concerns or worsening of condition.  he and was agreeable to the plan as discussed above.  he was given the opportunity to ask questions.  All of the patient's questions were answered.      Patient seen and discussed with ED attending physician.    Ann Corado MD  Emergency Medicine     Ann Corado MD  Resident  05/22/25 [id number]

## 2025-05-22 ENCOUNTER — PATIENT OUTREACH (OUTPATIENT)
Dept: CARE COORDINATION | Facility: CLINIC | Age: 25
End: 2025-05-22
Payer: MEDICAID

## 2025-05-22 LAB
HSV1 DNA SKIN QL NAA+PROBE: NOT DETECTED
HSV1 DNA SKIN QL NAA+PROBE: NOT DETECTED
HSV2 DNA SKIN QL NAA+PROBE: NOT DETECTED
HSV2 DNA SKIN QL NAA+PROBE: NOT DETECTED

## 2025-05-22 NOTE — DISCHARGE INSTRUCTIONS
You were seen in the ER for confirmatory lab testing for HSV. If your results are positive you will receive a telephone call. Please follow up with your primary care doctor.  You should come back to the emergency department if you have new or worsening symptoms, or any new concerns.

## 2025-05-22 NOTE — PROGRESS NOTES
Care management outreach call completed. Patient was provided with contact information for [facility] to assist with establishing care with a primary care provider.No additional follow-up needs identified at this time.   Sobeida Rey RN, Care Manager  Former[facility], [facility] Care  [phone]

## 2025-05-22 NOTE — ED PROVIDER NOTES
HPI   No chief complaint on file.      HPI  Patient is a 24-year-old with no past medical history presenting with HSV testing.  Patient said he presented to the ED about 5 days ago due to some rashes in his genital area.  Because of this, STD testing was done.  He then received a call from the pharmacist that he has HSV and was prescribed acyclovir.  Patient said he is IgG titration showed 1.6 which was indeterminant.  Because of this patient came back to get tested again.  Patient denies any fever, nausea and vomiting.  Patient rashes has subsided.      Patient History   Medical History[1]  Surgical History[2]  Family History[3]  Social History[4]    Physical Exam   ED Triage Vitals [05/20/25 1541]   Temperature Heart Rate Respirations BP   36.7 °C (98.1 °F) 95 16 128/63      Pulse Ox Temp Source Heart Rate Source Patient Position   96 % Oral Monitor --      BP Location FiO2 (%)     -- --       Physical Exam  Constitutional:       Appearance: Normal appearance.   HENT:      Head: Normocephalic and atraumatic.   Cardiovascular:      Rate and Rhythm: Normal rate and regular rhythm.   Pulmonary:      Effort: Pulmonary effort is normal.      Breath sounds: Normal breath sounds.   Abdominal:      General: Abdomen is flat. Bowel sounds are normal.      Palpations: Abdomen is soft.   Genitourinary:     Penis: Normal.       Testes: Normal.      Comments: Folliculitis. No sign of fluid-filled lesion.  Skin:     Capillary Refill: Capillary refill takes less than 2 seconds.   Neurological:      General: No focal deficit present.      Mental Status: He is alert and oriented to person, place, and time. Mental status is at baseline.           ED Course & MDM   Diagnoses as of 05/22/25 0001   Contact dermatitis, unspecified contact dermatitis type, unspecified trigger                 No data recorded     Leatha Coma Scale Score: 15 (05/20/25 1545 : Simona Healy RN)                           Medical Decision Making  Patient is  a 24-year-old no past medical history presenting for HSV testing.  At bedside patient was hemodynamically stable but was very upset.  On physical exam, patient had folliculitis on his penis.  There was no fluid-filled lesions.  There was no sign of HSV infection.  Patient tested positive for HSV 1 but not HSV-2.  The pharmacist did not explain to patient the diagnosis.  This patient does not need to be on acyclovir.  This patient does not have sexually transmitted disease.  After education, patient understood his diagnosis.  A message was sent to the pharmacist to take patient off the acyclovir.    Procedure  Procedures       [1]   Past Medical History:  Diagnosis Date    Bilateral intraabdominal testes     Bilateral cryptorchidism    Cough, unspecified 2016    Cough    Lower abdominal pain, unspecified     Groin pain    Personal history of (healed) traumatic fracture 2016    History of fracture of clavicle    Personal history of other diseases of the digestive system 2014    History of gastroesophageal reflux (GERD)     , unspecified weeks of gestation ([facility])     Prematurity    Testicular pain, unspecified 2016    Testicular pain    Tinnitus, bilateral 2020    Tinnitus of both ears    Unspecified fracture of unspecified metacarpal bone, initial encounter for closed fracture 2016    Fracture, metacarpal   [2]   Past Surgical History:  Procedure Laterality Date    OTHER SURGICAL HISTORY  2014    Surgery Testis    OTHER SURGICAL HISTORY  2014    Surgery Testis Orchiopexy, Inguinal Approach   [3] No family history on file.  [4]   Social History  Tobacco Use    Smoking status: Never    Smokeless tobacco: Never   Vaping Use    Vaping status: Never Used   Substance Use Topics    Alcohol use: Never    Drug use: Never        Ruddy Florian MD  Resident  [phone]

## 2025-05-29 DIAGNOSIS — J45.901 MILD ASTHMA WITH EXACERBATION, UNSPECIFIED WHETHER PERSISTENT (HHS-HCC): ICD-10-CM

## 2025-05-29 RX ORDER — MONTELUKAST SODIUM 10 MG/1
10 TABLET ORAL NIGHTLY
Qty: 30 TABLET | Refills: 1 | OUTPATIENT
Start: 2025-05-29 | End: 2026-05-29

## 2025-06-04 ENCOUNTER — HOSPITAL ENCOUNTER (EMERGENCY)
Facility: HOSPITAL | Age: 25
Discharge: HOME | End: 2025-06-04
Attending: STUDENT IN AN ORGANIZED HEALTH CARE EDUCATION/TRAINING PROGRAM
Payer: MEDICAID

## 2025-06-04 VITALS
WEIGHT: 155 LBS | HEART RATE: 84 BPM | OXYGEN SATURATION: 95 % | BODY MASS INDEX: 24.33 KG/M2 | DIASTOLIC BLOOD PRESSURE: 80 MMHG | HEIGHT: 67 IN | RESPIRATION RATE: 18 BRPM | SYSTOLIC BLOOD PRESSURE: 129 MMHG | TEMPERATURE: 97.7 F

## 2025-06-04 DIAGNOSIS — F41.9 ANXIETY: ICD-10-CM

## 2025-06-04 DIAGNOSIS — J30.2 SEASONAL ALLERGIES: Primary | ICD-10-CM

## 2025-06-04 LAB
FLUAV RNA RESP QL NAA+PROBE: NOT DETECTED
FLUBV RNA RESP QL NAA+PROBE: NOT DETECTED
RSV RNA RESP QL NAA+PROBE: NOT DETECTED
SARS-COV-2 RNA RESP QL NAA+PROBE: NOT DETECTED

## 2025-06-04 PROCEDURE — 2500000005 HC RX 250 GENERAL PHARMACY W/O HCPCS: Mod: SE

## 2025-06-04 PROCEDURE — 87637 SARSCOV2&INF A&B&RSV AMP PRB: CPT

## 2025-06-04 PROCEDURE — 2500000001 HC RX 250 WO HCPCS SELF ADMINISTERED DRUGS (ALT 637 FOR MEDICARE OP): Mod: SE

## 2025-06-04 PROCEDURE — 99284 EMERGENCY DEPT VISIT MOD MDM: CPT

## 2025-06-04 PROCEDURE — 99283 EMERGENCY DEPT VISIT LOW MDM: CPT | Performed by: STUDENT IN AN ORGANIZED HEALTH CARE EDUCATION/TRAINING PROGRAM

## 2025-06-04 RX ORDER — FLUTICASONE PROPIONATE 50 MCG
1 SPRAY, SUSPENSION (ML) NASAL DAILY
Qty: 15.8 G | Refills: 0 | Status: SHIPPED | OUTPATIENT
Start: 2025-06-04 | End: 2025-07-04

## 2025-06-04 RX ORDER — LIDOCAINE HYDROCHLORIDE 20 MG/ML
1.25 SOLUTION OROPHARYNGEAL ONCE
Status: COMPLETED | OUTPATIENT
Start: 2025-06-04 | End: 2025-06-04

## 2025-06-04 RX ORDER — IBUPROFEN 600 MG/1
600 TABLET, FILM COATED ORAL EVERY 6 HOURS PRN
Qty: 20 TABLET | Refills: 0 | Status: SHIPPED | OUTPATIENT
Start: 2025-06-04 | End: 2025-06-09

## 2025-06-04 RX ORDER — HYDROXYZINE HYDROCHLORIDE 25 MG/1
25 TABLET, FILM COATED ORAL ONCE
Status: COMPLETED | OUTPATIENT
Start: 2025-06-04 | End: 2025-06-04

## 2025-06-04 RX ORDER — CETIRIZINE HYDROCHLORIDE 10 MG/1
10 TABLET ORAL DAILY
Qty: 30 TABLET | Refills: 0 | Status: SHIPPED | OUTPATIENT
Start: 2025-06-04 | End: 2025-07-04

## 2025-06-04 RX ORDER — ACETAMINOPHEN 325 MG/1
975 TABLET ORAL ONCE
Status: COMPLETED | OUTPATIENT
Start: 2025-06-04 | End: 2025-06-04

## 2025-06-04 RX ORDER — ACETAMINOPHEN 325 MG/1
650 TABLET ORAL EVERY 6 HOURS PRN
Qty: 20 TABLET | Refills: 0 | Status: SHIPPED | OUTPATIENT
Start: 2025-06-04 | End: 2025-06-09

## 2025-06-04 RX ORDER — HYDROXYZINE HYDROCHLORIDE 25 MG/1
25 TABLET, FILM COATED ORAL EVERY 8 HOURS PRN
Qty: 15 TABLET | Refills: 0 | Status: SHIPPED | OUTPATIENT
Start: 2025-06-04 | End: 2025-06-09

## 2025-06-04 RX ORDER — DIPHENHYDRAMINE HCL 25 MG
25 CAPSULE ORAL ONCE
Status: COMPLETED | OUTPATIENT
Start: 2025-06-04 | End: 2025-06-04

## 2025-06-04 RX ADMIN — HYDROXYZINE HYDROCHLORIDE 25 MG: 25 TABLET, FILM COATED ORAL at 02:12

## 2025-06-04 RX ADMIN — LIDOCAINE HYDROCHLORIDE 1.25 ML: 20 SOLUTION ORAL at 02:03

## 2025-06-04 RX ADMIN — DIPHENHYDRAMINE HYDROCHLORIDE 25 MG: 25 CAPSULE ORAL at 02:02

## 2025-06-04 RX ADMIN — ACETAMINOPHEN 975 MG: 325 TABLET ORAL at 02:02

## 2025-06-04 ASSESSMENT — PAIN DESCRIPTION - PAIN TYPE: TYPE: ACUTE PAIN

## 2025-06-04 ASSESSMENT — PAIN DESCRIPTION - DESCRIPTORS: DESCRIPTORS: ACHING

## 2025-06-04 ASSESSMENT — PAIN - FUNCTIONAL ASSESSMENT
PAIN_FUNCTIONAL_ASSESSMENT: 0-10
PAIN_FUNCTIONAL_ASSESSMENT: 0-10

## 2025-06-04 ASSESSMENT — LIFESTYLE VARIABLES
HAVE YOU EVER FELT YOU SHOULD CUT DOWN ON YOUR DRINKING: NO
EVER HAD A DRINK FIRST THING IN THE MORNING TO STEADY YOUR NERVES TO GET RID OF A HANGOVER: NO
TOTAL SCORE: 0
EVER FELT BAD OR GUILTY ABOUT YOUR DRINKING: NO
HAVE PEOPLE ANNOYED YOU BY CRITICIZING YOUR DRINKING: NO

## 2025-06-04 ASSESSMENT — PAIN SCALES - GENERAL
PAINLEVEL_OUTOF10: 6
PAINLEVEL_OUTOF10: 6

## 2025-06-04 ASSESSMENT — PAIN DESCRIPTION - LOCATION: LOCATION: GENERALIZED

## 2025-06-04 NOTE — ED PROVIDER NOTES
History of Present Illness       History provided by: Patient  Limitations to History: None  External Records Reviewed with Brief Summary: None    HPI:  HPI     This is a 24-year-old male with past medical history of asthma presenting to the ED for multiple complaints.  States he almost passed out at work although did not have a syncopal episode.  Endorsing 6 days of sneezing, productive white cough, bilateral ear itching, right ear swelling, irritated gums, chills, headache, 1 episode of epistaxis this morning.  Not the worst headache of his life.  Denies recent sick contacts.  States he did take Allegra without relief in symptoms.  Denies nausea vomiting, abdominal pain, diarrhea, sore throat, body aches, chest pain.  States he did walk here.  States he does not follow with a primary care provider or dentist.    Physical Exam   Physical Exam  Constitutional:       Appearance: Normal appearance.   HENT:      Head: Normocephalic.      Right Ear: Hearing, tympanic membrane, ear canal and external ear normal. No tenderness. There is no impacted cerumen. No mastoid tenderness. Tympanic membrane is not erythematous or bulging.      Left Ear: Hearing, tympanic membrane, ear canal and external ear normal. No tenderness. There is no impacted cerumen. No mastoid tenderness. Tympanic membrane is not erythematous or bulging.      Nose: Nose normal.      Mouth/Throat:      Mouth: Mucous membranes are moist.      Dentition: Normal dentition. No dental tenderness, gingival swelling or gum lesions.      Pharynx: Oropharynx is clear. Uvula midline. No pharyngeal swelling, posterior oropharyngeal erythema or uvula swelling.      Tonsils: No tonsillar exudate or tonsillar abscesses. 0 on the right. 0 on the left.   Eyes:      Extraocular Movements: Extraocular movements intact.   Cardiovascular:      Rate and Rhythm: Normal rate and regular rhythm.   Pulmonary:      Effort: Pulmonary effort is normal.      Breath sounds: Normal  breath sounds. No stridor. No wheezing.   Abdominal:      Tenderness: There is no abdominal tenderness.   Musculoskeletal:         General: Normal range of motion.      Cervical back: Normal range of motion.   Skin:     General: Skin is warm.   Neurological:      General: No focal deficit present.      Mental Status: He is alert and oriented to person, place, and time.   Psychiatric:         Mood and Affect: Mood is anxious.         Behavior: Behavior normal.         Thought Content: Thought content normal.          Triage vitals:  T 36.5 °C (97.7 °F)  HR 84  /80  RR 18  O2 95 % None (Room air)      Medical Decision Making & ED Course     ED Course & MDM       Medical Decision Making:  Medical Decision Making  This is a 24-year-old male with past medical history of asthma presenting to the ED for multiple complaints.   Patient is alert and oriented with an anxious mood.  Vitally stable.  Posterior oropharynx without swelling, uvula is midline.  Gums without swelling or tenderness.  Teeth intact.  Bilateral ears without erythema, drainage.  TMs intact/pearly white.  Heart sounds and breath sounds normal.    Will treat symptomatically as well as give a dose of Atarax due to patient being anxious.  Viral swabs obtained.    Upon reassessment patient states he is feeling improved, gums are no longer irritated, no longer coughing or sneezing.  Patient discharged in stable condition with Zyrtec and Flonase at the pharmacy for allergies.  Tylenol and ibuprofen as needed for pain management.  Atarax as needed for anxiety.  Advised to follow-up with primary care provider.  Stay well-hydrated obtain adequate rest.  Discussed with him pending viral swabs although whether positive or negative would not treat with treatment plan.  Patient has expressed understanding and agreed to this plan.  Patient did request a work note in which he was provided.  ----      Differential diagnoses considered include but are not limited  "to: Seasonal allergies, viral illness, OM, OE, PTA, gingivitis, asthma exacerbation        Visit Vitals  /80 (BP Location: Left arm, Patient Position: Sitting)   Pulse 84   Temp 36.5 °C (97.7 °F)   Resp 18   Ht 1.702 m (5' 7\")   Wt 70.3 kg (155 lb)   SpO2 95%   BMI 24.28 kg/m²   Smoking Status Never   BSA 1.82 m²        Labs Reviewed   SARS-COV-2, INFLUENZA A/B AND RSV PCR       No orders to display       ED Course:  Diagnoses as of 06/04/25 0315   Seasonal allergies   Anxiety     Disposition     As a result of the work-up, the patient was discharged home.  he was informed of his diagnosis and instructed to come back with any concerns or worsening of condition.  he was agreeable to the plan as discussed above.  he was given the opportunity to ask questions.  All of the patient's questions were answered.      Procedures   Procedures    This was a shared visit with an ED attending.  The patient was seen and discussed with the ED attending    Honey Parker PA-C  Emergency Medicine      Honey Parker PA-C  06/04/25 0315       Honey Parker PA-C  06/04/25 0316    "

## 2025-06-04 NOTE — Clinical Note
Willian Newton was seen and treated in our emergency department on 6/4/2025.  He may return to work on 06/05/2025.       If you have any questions or concerns, please don't hesitate to call.      Honey Parker PA-C

## 2025-06-04 NOTE — ED TRIAGE NOTES
Pt presents with 6/10 body aches, headache and a productive cough producing thick white sputum, and irritated gums X 6 days

## 2025-06-04 NOTE — DISCHARGE INSTRUCTIONS
Take Atarax as needed for anxiety.  Take Zyrtec once daily.  Administer Flonase into both nostrils daily.  Take Tylenol/ibuprofen as needed for pain management.  Follow-up with primary care provider as soon as possible.  Return to ED for new or worsening symptoms.  Stay well-hydrated and obtain adequate rest.

## 2025-06-07 PROCEDURE — 99282 EMERGENCY DEPT VISIT SF MDM: CPT | Performed by: EMERGENCY MEDICINE

## 2025-06-07 PROCEDURE — 99284 EMERGENCY DEPT VISIT MOD MDM: CPT | Performed by: EMERGENCY MEDICINE

## 2025-06-07 PROCEDURE — 99283 EMERGENCY DEPT VISIT LOW MDM: CPT

## 2025-06-08 ENCOUNTER — HOSPITAL ENCOUNTER (EMERGENCY)
Facility: HOSPITAL | Age: 25
Discharge: HOME | End: 2025-06-08
Attending: EMERGENCY MEDICINE
Payer: MEDICAID

## 2025-06-08 VITALS
OXYGEN SATURATION: 97 % | WEIGHT: 150 LBS | DIASTOLIC BLOOD PRESSURE: 71 MMHG | RESPIRATION RATE: 16 BRPM | SYSTOLIC BLOOD PRESSURE: 132 MMHG | TEMPERATURE: 98.4 F | HEIGHT: 65 IN | HEART RATE: 81 BPM | BODY MASS INDEX: 24.99 KG/M2

## 2025-06-08 DIAGNOSIS — K08.89 PAIN, DENTAL: Primary | ICD-10-CM

## 2025-06-08 DIAGNOSIS — R21 RASH: ICD-10-CM

## 2025-06-08 PROCEDURE — 2500000005 HC RX 250 GENERAL PHARMACY W/O HCPCS: Mod: SE

## 2025-06-08 PROCEDURE — 2500000001 HC RX 250 WO HCPCS SELF ADMINISTERED DRUGS (ALT 637 FOR MEDICARE OP): Mod: SE

## 2025-06-08 RX ORDER — AMOXICILLIN AND CLAVULANATE POTASSIUM 875; 125 MG/1; MG/1
1 TABLET, FILM COATED ORAL EVERY 12 HOURS
Qty: 14 TABLET | Refills: 0 | Status: SHIPPED | OUTPATIENT
Start: 2025-06-08 | End: 2025-06-15

## 2025-06-08 RX ORDER — KETOCONAZOLE 20 MG/G
CREAM TOPICAL DAILY
Qty: 15 G | Refills: 0 | Status: SHIPPED | OUTPATIENT
Start: 2025-06-08

## 2025-06-08 RX ORDER — LIDOCAINE HYDROCHLORIDE 20 MG/ML
1.25 SOLUTION OROPHARYNGEAL ONCE
Status: COMPLETED | OUTPATIENT
Start: 2025-06-08 | End: 2025-06-08

## 2025-06-08 RX ORDER — AMOXICILLIN AND CLAVULANATE POTASSIUM 875; 125 MG/1; MG/1
1 TABLET, FILM COATED ORAL ONCE
Status: COMPLETED | OUTPATIENT
Start: 2025-06-08 | End: 2025-06-08

## 2025-06-08 RX ORDER — ACETAMINOPHEN 325 MG/1
650 TABLET ORAL ONCE
Status: COMPLETED | OUTPATIENT
Start: 2025-06-08 | End: 2025-06-08

## 2025-06-08 RX ORDER — OXYCODONE HYDROCHLORIDE 5 MG/1
5 TABLET ORAL ONCE
Refills: 0 | Status: COMPLETED | OUTPATIENT
Start: 2025-06-08 | End: 2025-06-08

## 2025-06-08 RX ADMIN — ACETAMINOPHEN 650 MG: 325 TABLET ORAL at 01:09

## 2025-06-08 RX ADMIN — AMOXICILLIN AND CLAVULANATE POTASSIUM 1 TABLET: 875; 125 TABLET, FILM COATED ORAL at 01:09

## 2025-06-08 RX ADMIN — LIDOCAINE HYDROCHLORIDE 1.25 ML: 20 SOLUTION ORAL at 01:09

## 2025-06-08 RX ADMIN — OXYCODONE 5 MG: 5 TABLET ORAL at 03:18

## 2025-06-08 ASSESSMENT — LIFESTYLE VARIABLES
EVER HAD A DRINK FIRST THING IN THE MORNING TO STEADY YOUR NERVES TO GET RID OF A HANGOVER: NO
HAVE PEOPLE ANNOYED YOU BY CRITICIZING YOUR DRINKING: NO
TOTAL SCORE: 0
EVER FELT BAD OR GUILTY ABOUT YOUR DRINKING: NO
HAVE YOU EVER FELT YOU SHOULD CUT DOWN ON YOUR DRINKING: NO

## 2025-06-08 ASSESSMENT — PAIN DESCRIPTION - ORIENTATION: ORIENTATION: RIGHT

## 2025-06-08 ASSESSMENT — PAIN - FUNCTIONAL ASSESSMENT: PAIN_FUNCTIONAL_ASSESSMENT: 0-10

## 2025-06-08 ASSESSMENT — PAIN SCALES - GENERAL: PAINLEVEL_OUTOF10: 9

## 2025-06-08 ASSESSMENT — COLUMBIA-SUICIDE SEVERITY RATING SCALE - C-SSRS
2. HAVE YOU ACTUALLY HAD ANY THOUGHTS OF KILLING YOURSELF?: NO
1. IN THE PAST MONTH, HAVE YOU WISHED YOU WERE DEAD OR WISHED YOU COULD GO TO SLEEP AND NOT WAKE UP?: NO

## 2025-06-08 ASSESSMENT — PAIN DESCRIPTION - LOCATION: LOCATION: MOUTH

## 2025-06-08 NOTE — ED PROVIDER NOTES
History of Present Illness       Limitations to history: None  Independent Historian: patient  External Records Reviewed: EMR, outside records, Care-everywhere    HPI:  HPI   This is a 24-year-old male history of asthma, adjustment disorder presenting to the ED with 2 days of right-sided tooth and cheek pain radiating into the right side of his jaw.  Patient states that he has pain and sensitivity to eating, is producing excessive saliva over the last 2 days as well.  Denies pain with swallowing, no difficulty with tolerating p.o.  Patient was seen for contact dermatitis on 5/18 after having a reaction to a belt that he was wearing, states that hydrocortisone had improved his symptoms until recently when he had an eruption of a rash along his lower abdomen and into his groin.  Denies any penile or scrotal involvement.  No oral involvement.  Denies nausea, vomiting, fevers, chills, shortness of breath.      Physical Exam   Physical Exam  Constitutional:       General: He is not in acute distress.     Appearance: Normal appearance. He is not ill-appearing.   HENT:      Head: Normocephalic and atraumatic.      Nose: Nose normal.      Mouth/Throat:      Mouth: Mucous membranes are moist.      Dentition: Dental tenderness and gingival swelling present.      Pharynx: Oropharynx is clear. Uvula midline. No oropharyngeal exudate or posterior oropharyngeal erythema.      Tonsils: No tonsillar exudate or tonsillar abscesses.        Comments: Dental tenderness, gingival swelling noted on right second molar.  Sialolith versus salivary gland irritation noted.  Eyes:      Extraocular Movements: Extraocular movements intact.      Pupils: Pupils are equal, round, and reactive to light.   Cardiovascular:      Rate and Rhythm: Normal rate and regular rhythm.   Abdominal:      Palpations: Abdomen is soft.      Tenderness: There is no abdominal tenderness.   Musculoskeletal:         General: Normal range of motion.   Skin:      "General: Skin is warm.      Findings: Rash present.             Comments: Dark red macular rash inferior to umbilicus. No vesicles or pustules. No excoriation.    Neurological:      Mental Status: He is alert.        Triage vitals:  T 36.9 °C (98.4 °F)  HR 81  /71  RR 16  O2 97 % None (Room air)        Medical Decision Making & ED Course     ED Course & [facility]     Medical Decision Making  Vital signs reviewed, afebrile, normotensive, not tachycardic.  Satting well on room air and speaking in full sentences in no acute distress.  History obtained from patient and chart.  Patient endorsing 2 days of dental and mouth pain, dental tenderness and gingival swelling along the right lower molars. [place] tooth appears to be grown in as well.  Enlarged salivary gland noted, likely sialolith and advised to eat sour candy for improvement of the symptoms.  Given Augmentin and viscous lidocaine soaked gauze for dental infection.  Patient does not want a dental block at this time.  Patient also endorsing a lower abdominal and groin rash, no penis or scrotal involvement.  States that symptoms had improved after given hydrocortisone for contact dermatitis from ED provider in May, states that symptoms had improved until 2 days ago when rash flared up again.  Diffuse dark pink macules inferior to the umbilicus and into groin noted, appears fungal in nature rather than contact dermatitis but low concern for SJS/TEN.  Will give prescription for topical ketoconazole.  Discharged with prescription for augmentin for dental infection and ketoconazole cream for rash.      Patient was discussed and staffed with  Dr Hubbard     ----    Visit Vitals  /71 (BP Location: Left arm, Patient Position: Sitting)   Pulse 81   Temp 36.9 °C (98.4 °F) (Temporal)   Resp 16   Ht 1.651 m (5' 5\")   Wt 68 kg (150 lb)   SpO2 97%   BMI 24.96 kg/m²   Smoking Status Never   BSA 1.77 m²        Labs Reviewed - No data to display    No orders to " display       ED Course:  Diagnoses as of 06/08/25 0308   Pain, dental   Rash     Disposition   As result of the workup, the patient was discharged home.  Patient was informed of their diagnosis and instructed to come back with any concerns or worsening of condition, agreeable to the plan above.  Patient given the opportunity ask questions, all of the patient's questions were answered.      Procedures   Procedures    COMMENT: Please note this report has been produced using speech recognition software and may contain errors related that system including errors in grammar, punctuation, spelling as well as words and phrases that may be inappropriate.  If there are any questions or concerns please feel free to contact the dictating provider for clarification.     Ann Kim PA-C  Emergency Medicine      Ann Kim PA-C  06/08/25 0313

## 2025-06-08 NOTE — DISCHARGE INSTRUCTIONS
Take augmentin twice a day for dental infection. You likely need your wisdom teeth removed, follow up with your dentist ASAP. Use ketoconazole for your rash. Follow up with PCP within 1 week.

## 2025-06-08 NOTE — Clinical Note
Willian Newton was seen and treated in our emergency department on 6/7/2025.  He may return to work on 06/10/2025.       If you have any questions or concerns, please don't hesitate to call.      Ann Kim PA-C

## 2025-06-08 NOTE — ED TRIAGE NOTES
R mouth/cheek pain/swelling per pt that started 2d ago, no obvious swelling noted in triage, able to speak in full sentences, no difficulty swallowing, denies dental issues, hx asthma, denies SOB

## 2025-06-15 ENCOUNTER — HOSPITAL ENCOUNTER (EMERGENCY)
Facility: HOSPITAL | Age: 25
Discharge: HOME | End: 2025-06-15
Attending: EMERGENCY MEDICINE
Payer: MEDICAID

## 2025-06-15 VITALS
SYSTOLIC BLOOD PRESSURE: 132 MMHG | DIASTOLIC BLOOD PRESSURE: 84 MMHG | WEIGHT: 10 LBS | RESPIRATION RATE: 16 BRPM | HEART RATE: 80 BPM | BODY MASS INDEX: 1.71 KG/M2 | HEIGHT: 64 IN | TEMPERATURE: 98.1 F | OXYGEN SATURATION: 97 %

## 2025-06-15 DIAGNOSIS — K13.79 MOUTH PAIN: Primary | ICD-10-CM

## 2025-06-15 PROCEDURE — 99283 EMERGENCY DEPT VISIT LOW MDM: CPT | Performed by: EMERGENCY MEDICINE

## 2025-06-15 PROCEDURE — 99282 EMERGENCY DEPT VISIT SF MDM: CPT | Performed by: EMERGENCY MEDICINE

## 2025-06-15 PROCEDURE — 99281 EMR DPT VST MAYX REQ PHY/QHP: CPT

## 2025-06-15 ASSESSMENT — LIFESTYLE VARIABLES
EVER HAD A DRINK FIRST THING IN THE MORNING TO STEADY YOUR NERVES TO GET RID OF A HANGOVER: NO
EVER FELT BAD OR GUILTY ABOUT YOUR DRINKING: NO
HAVE PEOPLE ANNOYED YOU BY CRITICIZING YOUR DRINKING: NO
HAVE YOU EVER FELT YOU SHOULD CUT DOWN ON YOUR DRINKING: NO
TOTAL SCORE: 0

## 2025-06-15 ASSESSMENT — PAIN SCALES - GENERAL: PAINLEVEL_OUTOF10: 0 - NO PAIN

## 2025-06-15 ASSESSMENT — PAIN - FUNCTIONAL ASSESSMENT: PAIN_FUNCTIONAL_ASSESSMENT: 0-10

## 2025-06-15 NOTE — ED TRIAGE NOTES
Pt presents with left sided mouth irritation and a rash on his abdomen that he first noticed 2-3 weeks ago

## 2025-06-15 NOTE — ED PROVIDER NOTES
Emergency Department Provider Note          History of Present Illness     CC: Dental Pain     History provided by: Patient  Limitations to History: None    HPI:   Willian Glez is a 24 y.o.male with PMH asthma presenting to the Emergency Department for multiple medical complaints.  Reports he has been having ulcers and that the lesions in his mouth that have been coming and going now for the past week.  Was seen Emergency Department 6 days ago for an identical complaint.  At that time complete STD testing and panel was performed and everything came back negative except for an HSV level at 1.7.  Cutoff of for this is 1.0.  Repeat pcr testing by his primary care doc is nonreactive.  Patient has severe anxiety that he has an underlying infection that we have not found.  Is requesting additional testing and imaging today.  Also has a rash over his abdomen likely from his nickel belt. Has been using hydrocortisone cream.  He said it is improving although the skin appears very dry.  Has not been using any moisturizers or other emollients on this.  Denies fevers, chills, chest pain, shortness of breath, abdominal pain, or changes in urination/stool.    Records Reviewed: Recent available ED and inpatient notes reviewed in EMR.    PMHx/PSHx:  Per HPI.   - has a past medical history of Bilateral intraabdominal testes, Cough, unspecified, Lower abdominal pain, unspecified, Personal history of (healed) traumatic fracture, Personal history of other diseases of the digestive system,  , unspecified weeks of gestation ([facility]), Testicular pain, unspecified, Tinnitus, bilateral, and Unspecified fracture of unspecified metacarpal bone, initial encounter for closed fracture.  - has a past surgical history that includes Other surgical history (2014) and Other surgical history (2014).  - has Adopted; Complaints of total body pain; Adjustment disorder with mixed anxiety and depressed mood; Psychological  factors affecting medical condition; Allergic rhinoconjunctivitis; Allergy to milk products; Anaphylaxis; Asthma; Atrophic testicle; Cholelithiasis; Constipation; Diarrhea; Disease due to severe acute respiratory syndrome coronavirus 2 (SARS-CoV-2); Atopic dermatitis; Eczema; Elevated liver enzymes; Emesis; Extreme fetal immaturity ([facility]); Headache; Insomnia; Migraine headache; Migraine with aura; Right upper quadrant abdominal pain; Right upper quadrant abdominal tenderness; Abdominal pain; Stomach pain; Tinnitus of both ears; Chronic respiratory disease arising in the  period; Recurrent epistaxis; URI (upper respiratory infection); and Atypical chest pain on their problem list.    Medications:  Current Outpatient Medications   Medication Instructions    albuterol 90 mcg/actuation inhaler 2 puffs, inhalation, Every 4 hours PRN    albuterol 90 mcg/actuation inhaler 2 puffs, inhalation, Every 4 hours PRN    amoxicillin-clavulanate (Augmentin) 875-125 mg tablet 1 tablet, oral, Every 12 hours    cetirizine (ZYRTEC) 10 mg, oral, Daily    d-methorphan/PE/acetaminophen (VICKS DAYQUIL COLD-FLU RELIEF ORAL) 30 mL, Every 4 hours PRN    diphenhydramine HCl (BENADRYL ALLERGY ORAL) 2 tablets, Daily PRN    famotidine (PEPCID) 40 mg, oral, Nightly PRN    fluticasone (Flonase) 50 mcg/actuation nasal spray 1 spray, Each Nostril, Daily, Shake gently. Before first use, prime pump. After use, clean tip and replace cap.    hydrocortisone 1 % cream 1 Application, Topical, 2 times daily, Apply to affected area 2 times daily    hydrOXYzine HCL (ATARAX) 25 mg, oral, Every 8 hours PRN    hydrOXYzine pamoate (VISTARIL) 50 mg, oral, 3 times daily PRN    ketoconazole (NIZOral) 2 % cream Topical, Daily    mirtazapine (REMERON) 7.5 mg, oral, Nightly PRN    mometasone-formoterol (Dulera) 200-5 mcg/actuation inhaler 2 puffs, inhalation, 2 times daily, Rinse mouth with water after use to reduce aftertaste and incidence of candidiasis.  Do not swallow.    montelukast (SINGULAIR) 10 mg, oral, Nightly        Allergies:  Bee pollen and Peanut    Social History:  - Tobacco:  reports that he has never smoked. He has never used smokeless tobacco.   - Alcohol:  reports no history of alcohol use.   - Illicit Drugs:  reports no history of drug use.     ROS:  Per HPI.       Physical Exam     Triage Vitals:  T 36.7 °C (98.1 °F)  HR 77  BP (!) 144/92  RR 16  O2 97 % None (Room air)    General: Awake, alert, in no acute distress  Eyes: Gaze conjugate.  No scleral icterus or injection  HENT: lateral wall of mouth with small 1mm erythematous lesion consistent with potential apthous ulcer. No raised lesions, herpetic lesions, or other findings. NO posterior oropharyngeal changes, no lymph node swelling.  CV: Regular rate, regular rhythm. Radial pulses 2+ bilaterally  Resp: Breathing non-labored, speaking in full sentences.  Clear to auscultation bilaterally  GI: Soft, non-distended, non-tender. No rebound or guarding.  MSK/Extremities: No gross bony deformities. Moving all extremities  Skin: Warm. Appropriate color  Neuro: Alert. Oriented. Face symmetric. Speech is fluent.  Gross strength and sensation intact in b/l UE and LEs  Psych: Appropriate mood and affect          [place] Coma Scale Score: 15                    Medical Decision Making & ED Course     EKG: EKG interpreted by myself. If applicable, please see ED Course for full interpretation.    Medical Decision Making   Willian Glez is a 24 y.o.male presenting to the Emergency Department for mouth wounds.  On arrival, initial blood pressure 144/92, rest of vital signs within normal limits.  Afebrile for Espey on examination, patient appears otherwise clinically well.  Anxious.  Clear heart and lung sounds bilaterally.  2 very minor erythematous lesions to the mouth. No raised lesions.  On review of lab work from previous ED visit close in a week ago, had extensive STD testing that was all negative.   Do not think further testing is indicated today.  Believe the likely cause of his symptoms today is aphthous ulcers from some viral mediated process.  No red flag symptoms.  Vital signs otherwise stable. ENT referral sent. Recommended lotions to his abdomen to help with his contact dermatitis-like rash.  Remains hemodynamically stable and we discharged home with close PCP follow-up.      Diagnoses as of 06/15/25 0500   Mouth pain       Independent Result Review and Interpretation: Relevant laboratory and radiographic results were reviewed and independently interpreted by myself.  As necessary, they are commented on in the ED Course.    Chronic conditions affecting the patient's care: As documented above in MDM      Disposition   Discharge    Isidoro Sequeira MD  Emergency Medicine PGY3      Procedures     Procedures ? SmartLinks last updated 6/15/2025 5:00 AM        Isidoro Sequeira MD  Resident  06/15/25 0500

## 2025-07-02 ENCOUNTER — HOSPITAL ENCOUNTER (EMERGENCY)
Facility: HOSPITAL | Age: 25
Discharge: HOME | End: 2025-07-02
Attending: STUDENT IN AN ORGANIZED HEALTH CARE EDUCATION/TRAINING PROGRAM
Payer: MEDICAID

## 2025-07-02 ENCOUNTER — CLINICAL SUPPORT (OUTPATIENT)
Dept: EMERGENCY MEDICINE | Facility: HOSPITAL | Age: 25
End: 2025-07-02
Payer: MEDICAID

## 2025-07-02 ENCOUNTER — APPOINTMENT (OUTPATIENT)
Dept: RADIOLOGY | Facility: HOSPITAL | Age: 25
End: 2025-07-02
Payer: MEDICAID

## 2025-07-02 VITALS
TEMPERATURE: 98.2 F | HEIGHT: 65 IN | SYSTOLIC BLOOD PRESSURE: 145 MMHG | RESPIRATION RATE: 18 BRPM | BODY MASS INDEX: 24.83 KG/M2 | DIASTOLIC BLOOD PRESSURE: 89 MMHG | WEIGHT: 149 LBS | HEART RATE: 98 BPM | OXYGEN SATURATION: 98 %

## 2025-07-02 DIAGNOSIS — R21 RASH: Primary | ICD-10-CM

## 2025-07-02 PROCEDURE — 71046 X-RAY EXAM CHEST 2 VIEWS: CPT | Performed by: STUDENT IN AN ORGANIZED HEALTH CARE EDUCATION/TRAINING PROGRAM

## 2025-07-02 PROCEDURE — 93005 ELECTROCARDIOGRAM TRACING: CPT

## 2025-07-02 PROCEDURE — 2500000004 HC RX 250 GENERAL PHARMACY W/ HCPCS (ALT 636 FOR OP/ED): Mod: SE

## 2025-07-02 PROCEDURE — 71046 X-RAY EXAM CHEST 2 VIEWS: CPT

## 2025-07-02 PROCEDURE — 87636 SARSCOV2 & INF A&B AMP PRB: CPT

## 2025-07-02 PROCEDURE — 99285 EMERGENCY DEPT VISIT HI MDM: CPT | Mod: 25 | Performed by: STUDENT IN AN ORGANIZED HEALTH CARE EDUCATION/TRAINING PROGRAM

## 2025-07-02 PROCEDURE — 2500000001 HC RX 250 WO HCPCS SELF ADMINISTERED DRUGS (ALT 637 FOR MEDICARE OP): Mod: SE

## 2025-07-02 RX ORDER — DIPHENHYDRAMINE HCL 25 MG
25 CAPSULE ORAL ONCE
Status: COMPLETED | OUTPATIENT
Start: 2025-07-02 | End: 2025-07-02

## 2025-07-02 RX ORDER — DIPHENHYDRAMINE HCL 25 MG
25 CAPSULE ORAL EVERY 6 HOURS PRN
Qty: 15 CAPSULE | Refills: 0 | Status: SHIPPED | OUTPATIENT
Start: 2025-07-02 | End: 2025-07-07

## 2025-07-02 RX ORDER — PREDNISONE 20 MG/1
20 TABLET ORAL ONCE
Status: COMPLETED | OUTPATIENT
Start: 2025-07-02 | End: 2025-07-02

## 2025-07-02 RX ORDER — ACETAMINOPHEN 325 MG/1
975 TABLET ORAL ONCE
Status: COMPLETED | OUTPATIENT
Start: 2025-07-02 | End: 2025-07-02

## 2025-07-02 RX ORDER — ACETAMINOPHEN 500 MG
1000 TABLET ORAL EVERY 6 HOURS PRN
Qty: 120 TABLET | Refills: 0 | Status: SHIPPED | OUTPATIENT
Start: 2025-07-02 | End: 2025-07-17

## 2025-07-02 RX ADMIN — DIPHENHYDRAMINE HYDROCHLORIDE 25 MG: 25 CAPSULE ORAL at 03:43

## 2025-07-02 RX ADMIN — PREDNISONE 20 MG: 20 TABLET ORAL at 03:44

## 2025-07-02 RX ADMIN — ACETAMINOPHEN 975 MG: 325 TABLET ORAL at 03:44

## 2025-07-02 ASSESSMENT — PAIN - FUNCTIONAL ASSESSMENT: PAIN_FUNCTIONAL_ASSESSMENT: 0-10

## 2025-07-02 ASSESSMENT — PAIN SCALES - GENERAL: PAINLEVEL_OUTOF10: 0 - NO PAIN

## 2025-07-02 NOTE — ED PROVIDER NOTES
Emergency Department Provider Note        History of Present Illness     History provided by: Patient  Limitations to History: None  External Records Reviewed with Brief Summary: None    HPI:  Willian Glez is a 24 y.o. male with past medical history of asthma, peanut and penicillin allergy presents for skin irritation. Patient reports that he was getting over a cold about a week ago. About a week or 2 weeks ago had amoxicillin for presumed dental infection and had reaction to the medication. He reports he developed hives, was told to stop the medication and symptoms resolved. He is now reporting hives for the past 3 days, now improving and no longer present. He reports some subjective swelling to his mouth and irritation to his mouth. He denies any new detergents, other medications, skin products, food exposure. He did try a new toothpaste recently. Patient also endorsing some chest tightness, intermittent shortness of breath, productive cough. Patient denies abdominal pain.    Physical Exam   Triage vitals:  T 36.8 °C (98.2 °F)  HR 98  /89  RR 18  O2 98 % None (Room air)    General: Awake, alert, in no acute distress  Eyes: Gaze conjugate.  No scleral icterus or injection  HENT: Normo-cephalic, atraumatic. No stridor, mild erythema to neck, no swelling, no erythematous posterior oropharynx, no stridor, no submandibular swelling or peritonsillar swelling, canker sore to right upper mouth present  CV: Regular rate, regular rhythm. Radial pulses 2+ bilaterally  Resp: Breathing non-labored, speaking in full sentences.  Clear to auscultation bilaterally  GI: Soft, non-distended, non-tender. No rebound or guarding.   MSK/Extremities: No gross bony deformities. Moving all extremities  Skin: Warm. Appropriate color  Neuro: Alert. Oriented. Face symmetric. Speech is fluent.  Gross strength and sensation intact in b/l UE and LEs  Psych: Appropriate mood and affect    Medical Decision Making & ED Course    Medical Decision Makin y.o. male here for skin irritation. There is mild erythema to the neck. Patient is afebrile, hemodynamically stable, satting well on room air. Patient has nonerythematous posterior oropharynx. There is no signs of PTA, RPA, dental caries or dental infection. Patient has no notable hives on exam. Given prior hives, likely skin irritation/allergic reaction. Patient does have canker sore to mouth. Patient has no other system involvement, less concerning for anaphylaxis at this time. Patient did have cough, so viral swabs obtained to assess for viral infection. Viral swabs are negative. Chest x-ray shows no signs of pneumonia. EKG shows normal sinus rhythm. Less concerning for any ACS at this time. He was given list for dental follow-up. Patient given meds for symptomatic treatment. As a result of the work-up, patient was discharged home.  They were informed of their diagnosis and instructed to come back with any concerns or worsening of condition and was agreeable to the plan as discussed above.  The patient was given the opportunity to ask questions.  All of the patient's questions were answered.  The patient remained stable under my care.     ----  Differential diagnoses considered include but are not limited to: Allergic reaction, ACS, pleuritis, pneumonia, pneumothorax, pericarditis, GERD, esophageal rupture, costochondritis, musculoskeletal pain, allergic reaction, contact dermatitis, canker sore, RPA, PTA     Social Determinants of Health which Significantly Impact Care: None identified     EKG Independent Interpretation: EKG interpreted by myself. Please see ED Course for full interpretation.    Independent Result Review and Interpretation: Relevant laboratory and radiographic results were reviewed and independently interpreted by myself.  As necessary, they are commented on in the ED Course.    Chronic conditions affecting the patient's care: As documented above in MDM    The  patient was discussed with the following consultants/services: None    Care Considerations: As documented above in [facility]    ED Course:  ED Course as of 07/02/25 0513 Wed Jul 02, 2025   0451 I independently interpreted the EKG:  Sinus arrythmia. Normal axis.   Normal TX, QRS, and Qtc intervals.   No ST segment elevations, depressions, or T wave inversions.   [AT]   0511 Swabs negative [AT]   0511 Cxr unremarkable [AT]      ED Course User Index  [AT] Carole Peng MD         Diagnoses as of 07/02/25 0513   Rash     Disposition   As a result of the work-up, the patient was discharged home.  he was informed of his diagnosis and instructed to come back with any concerns or worsening of condition.  he and was agreeable to the plan as discussed above.  he was given the opportunity to ask questions.  All of the patient's questions were answered.    Procedures   Procedures    Patient seen and discussed with ED attending physician.    Carole Peng MD  Emergency Medicine     Carole Peng MD  Resident  07/02/25 0631

## 2025-07-02 NOTE — DISCHARGE INSTRUCTIONS
Please follow up with your primary care doctor.    Return if you have:  Temperature greater than 100.4  Severe uncontrolled pain  Difficulty breathing  Swelling to face or neck that is worsening  Any other questions or concerns you may have after discharge    In an emergency, call 911 or go to an Emergency Department at a nearby hospital

## 2025-07-02 NOTE — ED TRIAGE NOTES
Patient presents to the ED for skin irritation. Patient is endorsing irritation to his neck and face that started three days ago.

## 2025-07-03 ENCOUNTER — APPOINTMENT (OUTPATIENT)
Dept: PULMONOLOGY | Facility: HOSPITAL | Age: 25
End: 2025-07-03
Payer: MEDICAID

## 2025-07-03 LAB
ATRIAL RATE: 74 BPM
P AXIS: 56 DEGREES
P OFFSET: 200 MS
P ONSET: 151 MS
PR INTERVAL: 136 MS
Q ONSET: 219 MS
QRS COUNT: 12 BEATS
QRS DURATION: 92 MS
QT INTERVAL: 350 MS
QTC CALCULATION(BAZETT): 388 MS
QTC FREDERICIA: 375 MS
R AXIS: 63 DEGREES
T AXIS: 44 DEGREES
T OFFSET: 394 MS
VENTRICULAR RATE: 74 BPM

## 2025-07-11 ENCOUNTER — APPOINTMENT (OUTPATIENT)
Dept: PRIMARY CARE | Facility: CLINIC | Age: 25
End: 2025-07-11
Payer: MEDICAID

## 2025-07-14 ENCOUNTER — PATIENT MESSAGE (OUTPATIENT)
Dept: CARE COORDINATION | Facility: CLINIC | Age: 25
End: 2025-07-14
Payer: MEDICAID

## 2025-07-14 ENCOUNTER — PATIENT OUTREACH (OUTPATIENT)
Dept: CARE COORDINATION | Facility: CLINIC | Age: 25
End: 2025-07-14
Payer: MEDICAID

## 2025-07-26 ENCOUNTER — APPOINTMENT (OUTPATIENT)
Dept: RADIOLOGY | Facility: HOSPITAL | Age: 25
End: 2025-07-26
Payer: MEDICAID

## 2025-07-26 ENCOUNTER — HOSPITAL ENCOUNTER (EMERGENCY)
Facility: HOSPITAL | Age: 25
Discharge: HOME | End: 2025-07-26
Attending: EMERGENCY MEDICINE
Payer: MEDICAID

## 2025-07-26 VITALS
SYSTOLIC BLOOD PRESSURE: 136 MMHG | WEIGHT: 149 LBS | DIASTOLIC BLOOD PRESSURE: 83 MMHG | HEIGHT: 65 IN | TEMPERATURE: 97.7 F | BODY MASS INDEX: 24.83 KG/M2 | HEART RATE: 84 BPM | RESPIRATION RATE: 18 BRPM | OXYGEN SATURATION: 97 %

## 2025-07-26 DIAGNOSIS — J45.901 MILD ASTHMA WITH EXACERBATION, UNSPECIFIED WHETHER PERSISTENT (HHS-HCC): ICD-10-CM

## 2025-07-26 DIAGNOSIS — J45.901 PERSISTENT ASTHMA WITH ACUTE EXACERBATION, UNSPECIFIED ASTHMA SEVERITY (HHS-HCC): ICD-10-CM

## 2025-07-26 DIAGNOSIS — J45.21 MILD INTERMITTENT ASTHMA WITH EXACERBATION (HHS-HCC): Primary | ICD-10-CM

## 2025-07-26 DIAGNOSIS — J30.2 SEASONAL ALLERGIES: ICD-10-CM

## 2025-07-26 DIAGNOSIS — J45.909 UNSPECIFIED ASTHMA, UNCOMPLICATED (HHS-HCC): ICD-10-CM

## 2025-07-26 PROCEDURE — 71046 X-RAY EXAM CHEST 2 VIEWS: CPT

## 2025-07-26 PROCEDURE — 94640 AIRWAY INHALATION TREATMENT: CPT

## 2025-07-26 PROCEDURE — 2500000001 HC RX 250 WO HCPCS SELF ADMINISTERED DRUGS (ALT 637 FOR MEDICARE OP): Mod: SE

## 2025-07-26 PROCEDURE — 99285 EMERGENCY DEPT VISIT HI MDM: CPT | Performed by: EMERGENCY MEDICINE

## 2025-07-26 PROCEDURE — 93010 ELECTROCARDIOGRAM REPORT: CPT | Performed by: EMERGENCY MEDICINE

## 2025-07-26 PROCEDURE — 71046 X-RAY EXAM CHEST 2 VIEWS: CPT | Mod: FOREIGN READ | Performed by: RADIOLOGY

## 2025-07-26 PROCEDURE — 99285 EMERGENCY DEPT VISIT HI MDM: CPT | Mod: 25 | Performed by: EMERGENCY MEDICINE

## 2025-07-26 PROCEDURE — 2500000002 HC RX 250 W HCPCS SELF ADMINISTERED DRUGS (ALT 637 FOR MEDICARE OP, ALT 636 FOR OP/ED): Mod: SE

## 2025-07-26 PROCEDURE — 2500000004 HC RX 250 GENERAL PHARMACY W/ HCPCS (ALT 636 FOR OP/ED): Mod: SE

## 2025-07-26 RX ORDER — PREDNISONE 20 MG/1
40 TABLET ORAL ONCE
Status: COMPLETED | OUTPATIENT
Start: 2025-07-26 | End: 2025-07-26

## 2025-07-26 RX ORDER — PREDNISONE 10 MG/1
40 TABLET ORAL DAILY
Qty: 16 TABLET | Refills: 0 | Status: SHIPPED | OUTPATIENT
Start: 2025-07-26 | End: 2025-07-30

## 2025-07-26 RX ORDER — ALBUTEROL SULFATE 90 UG/1
2 INHALANT RESPIRATORY (INHALATION) EVERY 4 HOURS PRN
Qty: 18 G | Refills: 0 | Status: SHIPPED | OUTPATIENT
Start: 2025-07-26 | End: 2025-08-25

## 2025-07-26 RX ORDER — MONTELUKAST SODIUM 10 MG/1
10 TABLET ORAL NIGHTLY
Qty: 30 TABLET | Refills: 0 | Status: SHIPPED | OUTPATIENT
Start: 2025-07-26 | End: 2025-08-25

## 2025-07-26 RX ORDER — FLUTICASONE PROPIONATE 50 MCG
1 SPRAY, SUSPENSION (ML) NASAL DAILY
Qty: 15.8 G | Refills: 0 | Status: SHIPPED | OUTPATIENT
Start: 2025-07-26 | End: 2025-08-25

## 2025-07-26 RX ORDER — ALBUTEROL SULFATE 90 UG/1
2 INHALANT RESPIRATORY (INHALATION) ONCE
Status: COMPLETED | OUTPATIENT
Start: 2025-07-26 | End: 2025-07-26

## 2025-07-26 RX ORDER — MOMETASONE FUROATE AND FORMOTEROL FUMARATE DIHYDRATE 200; 5 UG/1; UG/1
2 AEROSOL RESPIRATORY (INHALATION) 2 TIMES DAILY
Qty: 13 G | Refills: 0 | Status: SHIPPED | OUTPATIENT
Start: 2025-07-26

## 2025-07-26 RX ORDER — IPRATROPIUM BROMIDE AND ALBUTEROL SULFATE 2.5; .5 MG/3ML; MG/3ML
3 SOLUTION RESPIRATORY (INHALATION) EVERY 20 MIN
Status: COMPLETED | OUTPATIENT
Start: 2025-07-26 | End: 2025-07-26

## 2025-07-26 RX ADMIN — PREDNISONE 40 MG: 20 TABLET ORAL at 03:13

## 2025-07-26 RX ADMIN — IPRATROPIUM BROMIDE AND ALBUTEROL SULFATE 3 ML: .5; 3 SOLUTION RESPIRATORY (INHALATION) at 03:48

## 2025-07-26 RX ADMIN — IPRATROPIUM BROMIDE AND ALBUTEROL SULFATE 3 ML: .5; 3 SOLUTION RESPIRATORY (INHALATION) at 03:32

## 2025-07-26 RX ADMIN — IPRATROPIUM BROMIDE AND ALBUTEROL SULFATE 3 ML: .5; 3 SOLUTION RESPIRATORY (INHALATION) at 03:13

## 2025-07-26 RX ADMIN — ALBUTEROL SULFATE 2 PUFF: 90 AEROSOL, METERED RESPIRATORY (INHALATION) at 04:00

## 2025-07-26 ASSESSMENT — PAIN DESCRIPTION - LOCATION: LOCATION: CHEST

## 2025-07-26 ASSESSMENT — PAIN DESCRIPTION - PAIN TYPE: TYPE: ACUTE PAIN

## 2025-07-26 ASSESSMENT — PAIN SCALES - GENERAL: PAINLEVEL_OUTOF10: 4

## 2025-07-26 ASSESSMENT — PAIN - FUNCTIONAL ASSESSMENT: PAIN_FUNCTIONAL_ASSESSMENT: 0-10

## 2025-07-26 ASSESSMENT — PAIN DESCRIPTION - ORIENTATION: ORIENTATION: MID

## 2025-07-26 ASSESSMENT — PAIN DESCRIPTION - DESCRIPTORS: DESCRIPTORS: HEAVINESS

## 2025-07-26 NOTE — DISCHARGE INSTRUCTIONS
You presents to the ED for difficulty breathing that is likely an asthma exacerbation.  Follow-up with primary care within the next week.  Follow-up with pulmonology as needed.  Please return to the emergency department for any new or worsening symptoms including but limited to chest pain and difficulty breathing.  Please take medications as prescribed.  The phone number for the primary care clinic at [facility] is [phone].  Pulmonology clinic phone number is [phone].

## 2025-07-26 NOTE — ED PROVIDER NOTES
CC: Shortness of Breath     HPI:  Patient is a 24-year-old male with a past medical history of asthma who presented to the ED for chief complaint of dyspnea.  Noted that his dyspnea is been ongoing over the past 4 days.  Coincides with nonproductive cough and congestion.  Notes that he has taken 15 puffs over the past 2 days with minimal improvement of his work of breathing.  Denied fevers, chills, chest pain, difficulty breathing, headache, abdominal pain, neck pain, back pain, trauma, falls, history of blood clots, lower extremity edema, loss of consciousness, nausea, and vomiting.    Limitations to history: None  Independent historian(s): Patient  Records Reviewed: Recent available ED and inpatient notes reviewed in EMR.    PMHx/PSHx:  Per HPI.   - has a past medical history of Bilateral intraabdominal testes, Cough, unspecified (2016), Lower abdominal pain, unspecified, Personal history of (healed) traumatic fracture (2016), Personal history of other diseases of the digestive system (2014),  , unspecified weeks of gestation ([facility]), Testicular pain, unspecified (2016), Tinnitus, bilateral (2020), and Unspecified fracture of unspecified metacarpal bone, initial encounter for closed fracture (2016).  - has a past surgical history that includes Other surgical history (2014) and Other surgical history (2014).    Medications:  Reviewed in EMR. See EMR for complete list of medications and doses.    Allergies:  Penicillins, Bee pollen, and Peanut    Social History:  - Tobacco:  reports that he has never smoked. He has never used smokeless tobacco.   - Alcohol:  reports no history of alcohol use.   - Illicit Drugs:  reports no history of drug use.     ROS:  Per HPI.       ???????????????????????????????????????????????????????????????  Triage Vitals:  T 36.5 °C (97.7 °F)  HR 84  /83  RR 18  O2 97 %      Physical Exam  Vitals and nursing note  reviewed.   Constitutional:       General: He is not in acute distress.  HENT:      Head: Normocephalic and atraumatic.      Nose: Nose normal.      Mouth/Throat:      Mouth: Mucous membranes are moist.     Eyes:      Conjunctiva/sclera: Conjunctivae normal.       Cardiovascular:      Rate and Rhythm: Normal rate and regular rhythm.      Pulses: Normal pulses.   Pulmonary:      Effort: Pulmonary effort is normal. No respiratory distress.      Comments: Diffuse expiratory wheezing.  Abdominal:      Palpations: Abdomen is soft.      Tenderness: There is no abdominal tenderness.     Skin:     General: Skin is warm.     Neurological:      General: No focal deficit present.      Mental Status: He is alert.         ???????????????????????????????????????????????????????????????  Labs:   Labs Reviewed - No data to display     Imaging:   No orders to display        EKG:  Rate is 82, sinus rhythm, normal axis, no interval prolongation, no st elevation or depression.  When compared to EKG on 7/2/2025 review of EKG does not show any signs of STEMI, complete heart block, asystole, V-fib.    MDM:  Patient is a 24-year-old male with a past medical history of asthma who presented to the ED for chief complaint of dyspnea.  Patient presented HDS.  Physical exam significant for diffuse expiratory wheezing.  Low clinical concern for PE, pneumothorax, and ACS.  Patient likely has an asthma exacerbation secondary to viral etiology.  Lab work not indicated at this time.  Low clinical concern for bacterial infection.  CXR ordered.  DuoNebs and steroid ordered.  Please see ED course and disposition for remainder of care.    ED Course:  ED Course as of 07/27/25 2007   Sat Jul 26, 2025   0557 XR chest 2 views  Upon my interpretation, CXR without acute cardiopulmonary process. [MH]      ED Course User Index  [MH] Fernando Quinones MD         Diagnoses as of 07/27/25 2007   Mild intermittent asthma with exacerbation ([facility])       Social  Determinants Limiting Care:  None identified    Disposition:  Upon reevaluation, patient noted feeling significantly better and feels ready to go home.  Patient prescribed prednisone.  Patient was also prescribed montelukast, Dulera, Flonase, and albuterol inhaler.  Notes that he ran out of those medications.  Patient ordered primary care and pulmonology follow-up.  Patient received phone number to respective clinics.  Discussed ED findings, plan for outpatient primary follow-up within the next week, pulmonary follow-up when able, taking prednisone as prescribed, and strict return precautions for any new or worsening symptoms including but limited to concerning chest pain difficulty breathing.    Fernando Quinones MD   Emergency Medicine PGY-3  [facility]    Comment: Please note this report has been produced using speech recognition software and may contain errors related to that system including errors in grammar, punctuation, and spelling as well as words and phrases that may be inappropriate.  If there are any questions or concerns please feel free to contact the dictating provider for clarification.    Procedures ? SmartLinks last updated 7/26/2025 3:07 AM        Fernando Quinones MD  Resident  07/27/25 2026       Fernando Quinones MD  Resident  07/27/25 2028

## 2025-07-26 NOTE — ED TRIAGE NOTES
Pt presents to the ED d/t shortness of breath. Pt states he has asthma and tried his inhaler and did not work. Pt is AxOx4 and denies any other issues.

## 2025-08-25 ENCOUNTER — APPOINTMENT (OUTPATIENT)
Dept: PRIMARY CARE | Facility: CLINIC | Age: 25
End: 2025-08-25
Payer: MEDICAID

## 2025-08-25 VITALS
OXYGEN SATURATION: 95 % | HEART RATE: 77 BPM | BODY MASS INDEX: 24.99 KG/M2 | SYSTOLIC BLOOD PRESSURE: 122 MMHG | WEIGHT: 150 LBS | DIASTOLIC BLOOD PRESSURE: 53 MMHG | HEIGHT: 65 IN

## 2025-08-25 DIAGNOSIS — G51.9 FACIAL NEUROPATHY: ICD-10-CM

## 2025-08-25 DIAGNOSIS — J45.909 UNSPECIFIED ASTHMA, UNCOMPLICATED (HHS-HCC): ICD-10-CM

## 2025-08-25 DIAGNOSIS — J45.901 MILD ASTHMA WITH EXACERBATION, UNSPECIFIED WHETHER PERSISTENT (HHS-HCC): ICD-10-CM

## 2025-08-25 DIAGNOSIS — R79.82 CRP ELEVATED: ICD-10-CM

## 2025-08-25 DIAGNOSIS — K21.9 LARYNGOPHARYNGEAL REFLUX (LPR): ICD-10-CM

## 2025-08-25 DIAGNOSIS — R21 RASH: Primary | ICD-10-CM

## 2025-08-25 PROBLEM — J06.9 URI (UPPER RESPIRATORY INFECTION): Status: RESOLVED | Noted: 2023-06-22 | Resolved: 2025-08-25

## 2025-08-25 PROBLEM — H10.10 ALLERGIC RHINOCONJUNCTIVITIS: Status: RESOLVED | Noted: 2023-06-22 | Resolved: 2025-08-25

## 2025-08-25 PROBLEM — R04.0 RECURRENT EPISTAXIS: Status: RESOLVED | Noted: 2023-06-22 | Resolved: 2025-08-25

## 2025-08-25 PROBLEM — R10.11 RIGHT UPPER QUADRANT ABDOMINAL PAIN: Status: RESOLVED | Noted: 2023-06-22 | Resolved: 2025-08-25

## 2025-08-25 PROBLEM — J30.9 ALLERGIC RHINOCONJUNCTIVITIS: Status: RESOLVED | Noted: 2023-06-22 | Resolved: 2025-08-25

## 2025-08-25 PROBLEM — U07.1 DISEASE DUE TO SEVERE ACUTE RESPIRATORY SYNDROME CORONAVIRUS 2 (SARS-COV-2): Status: RESOLVED | Noted: 2023-06-22 | Resolved: 2025-08-25

## 2025-08-25 PROBLEM — R10.9 STOMACH PAIN: Status: RESOLVED | Noted: 2023-06-22 | Resolved: 2025-08-25

## 2025-08-25 PROBLEM — R10.811 RIGHT UPPER QUADRANT ABDOMINAL TENDERNESS: Status: RESOLVED | Noted: 2023-06-22 | Resolved: 2025-08-25

## 2025-08-25 PROBLEM — R19.7 DIARRHEA: Status: RESOLVED | Noted: 2023-06-22 | Resolved: 2025-08-25

## 2025-08-25 PROBLEM — K59.00 CONSTIPATION: Status: RESOLVED | Noted: 2023-06-22 | Resolved: 2025-08-25

## 2025-08-25 PROBLEM — R11.10 EMESIS: Status: RESOLVED | Noted: 2023-06-22 | Resolved: 2025-08-25

## 2025-08-25 PROBLEM — H93.13 TINNITUS OF BOTH EARS: Status: RESOLVED | Noted: 2023-06-22 | Resolved: 2025-08-25

## 2025-08-25 PROCEDURE — 99385 PREV VISIT NEW AGE 18-39: CPT | Performed by: INTERNAL MEDICINE

## 2025-08-25 PROCEDURE — 1036F TOBACCO NON-USER: CPT | Performed by: INTERNAL MEDICINE

## 2025-08-25 PROCEDURE — 3008F BODY MASS INDEX DOCD: CPT | Performed by: INTERNAL MEDICINE

## 2025-08-25 RX ORDER — MOMETASONE FUROATE AND FORMOTEROL FUMARATE DIHYDRATE 200; 5 UG/1; UG/1
2 AEROSOL RESPIRATORY (INHALATION) 2 TIMES DAILY
Qty: 13 G | Refills: 11 | Status: SHIPPED | OUTPATIENT
Start: 2025-08-25 | End: 2025-08-25

## 2025-08-25 RX ORDER — MONTELUKAST SODIUM 10 MG/1
10 TABLET ORAL NIGHTLY
Qty: 90 TABLET | Refills: 3 | Status: SHIPPED | OUTPATIENT
Start: 2025-08-25 | End: 2025-08-25

## 2025-08-25 RX ORDER — MOMETASONE FUROATE AND FORMOTEROL FUMARATE DIHYDRATE 200; 5 UG/1; UG/1
2 AEROSOL RESPIRATORY (INHALATION) 2 TIMES DAILY
Qty: 13 G | Refills: 11 | Status: SHIPPED | OUTPATIENT
Start: 2025-08-25

## 2025-08-25 RX ORDER — OMEPRAZOLE 40 MG/1
40 CAPSULE, DELAYED RELEASE ORAL
Qty: 30 CAPSULE | Refills: 0 | Status: SHIPPED | OUTPATIENT
Start: 2025-08-25 | End: 2025-08-25

## 2025-08-25 RX ORDER — MONTELUKAST SODIUM 10 MG/1
10 TABLET ORAL NIGHTLY
Qty: 90 TABLET | Refills: 3 | Status: SHIPPED | OUTPATIENT
Start: 2025-08-25 | End: 2026-08-20

## 2025-08-25 RX ORDER — OMEPRAZOLE 40 MG/1
40 CAPSULE, DELAYED RELEASE ORAL
Qty: 30 CAPSULE | Refills: 0 | Status: SHIPPED | OUTPATIENT
Start: 2025-08-25 | End: 2025-09-24

## 2025-08-25 ASSESSMENT — PATIENT HEALTH QUESTIONNAIRE - PHQ9
2. FEELING DOWN, DEPRESSED OR HOPELESS: NOT AT ALL
1. LITTLE INTEREST OR PLEASURE IN DOING THINGS: NOT AT ALL
SUM OF ALL RESPONSES TO PHQ9 QUESTIONS 1 AND 2: 0

## 2026-01-21 ENCOUNTER — APPOINTMENT (OUTPATIENT)
Dept: OTOLARYNGOLOGY | Facility: HOSPITAL | Age: 26
End: 2026-01-21
Payer: MEDICAID